# Patient Record
Sex: MALE | Race: WHITE | NOT HISPANIC OR LATINO | Employment: OTHER | URBAN - NONMETROPOLITAN AREA
[De-identification: names, ages, dates, MRNs, and addresses within clinical notes are randomized per-mention and may not be internally consistent; named-entity substitution may affect disease eponyms.]

---

## 2017-07-24 ENCOUNTER — APPOINTMENT (OUTPATIENT)
Dept: OCCUPATIONAL MEDICINE | Facility: OTHER | Age: 62
End: 2017-07-24

## 2017-07-24 PROCEDURE — 99000 SPECIMEN HANDLING OFFICE-LAB: CPT

## 2017-08-01 ENCOUNTER — TRANSFERRED RECORDS (OUTPATIENT)
Dept: HEALTH INFORMATION MANAGEMENT | Facility: HOSPITAL | Age: 62
End: 2017-08-01

## 2017-08-02 ENCOUNTER — OFFICE VISIT (OUTPATIENT)
Dept: FAMILY MEDICINE | Facility: OTHER | Age: 62
End: 2017-08-02
Attending: FAMILY MEDICINE
Payer: COMMERCIAL

## 2017-08-02 VITALS
WEIGHT: 179 LBS | SYSTOLIC BLOOD PRESSURE: 114 MMHG | OXYGEN SATURATION: 99 % | HEART RATE: 67 BPM | BODY MASS INDEX: 27.13 KG/M2 | RESPIRATION RATE: 20 BRPM | HEIGHT: 68 IN | DIASTOLIC BLOOD PRESSURE: 70 MMHG

## 2017-08-02 DIAGNOSIS — Z01.818 PREOP GENERAL PHYSICAL EXAM: Primary | ICD-10-CM

## 2017-08-02 PROCEDURE — 99214 OFFICE O/P EST MOD 30 MIN: CPT | Mod: 25 | Performed by: FAMILY MEDICINE

## 2017-08-02 PROCEDURE — 93000 ELECTROCARDIOGRAM COMPLETE: CPT | Performed by: INTERNAL MEDICINE

## 2017-08-02 ASSESSMENT — PAIN SCALES - GENERAL: PAINLEVEL: NO PAIN (0)

## 2017-08-02 NOTE — MR AVS SNAPSHOT
After Visit Summary   8/2/2017    Javier Zimmer    MRN: [id number]           Patient Information     Date Of Birth          1955        Visit Information        Provider Department      8/2/2017 8:20 AM Javier Lomeli MD [facility]        Today's Diagnoses     Preop general physical exam    -  1      Care Instructions      Before Your Surgery      Call your surgeon if there is any change in your health. This includes signs of a cold or flu (such as a sore throat, runny nose, cough, rash or fever).    Do not smoke, drink alcohol or take over the counter medicine (unless your surgeon or primary care doctor tells you to) for the 24 hours before and after surgery.    If you take prescribed drugs: Follow your doctor s orders about which medicines to take and which to stop until after surgery.    Eating and drinking prior to surgery: follow the instructions from your surgeon    Take a shower or bath the night before surgery. Use the soap your surgeon gave you to gently clean your skin. If you do not have soap from your surgeon, use your regular soap. Do not shave or scrub the surgery site.  Wear clean pajamas and have clean sheets on your bed.           Follow-ups after your visit        Follow-up notes from your care team     Return as needed.      Who to contact     If you have questions or need follow up information about today's clinic visit or your schedule please contact [facility] directly at [phone].  Normal or non-critical lab and imaging results will be communicated to you by MyChart, letter or phone within 4 business days after the clinic has received the results. If you do not hear from us within 7 days, please contact the clinic through MyChart or phone. If you have a critical or abnormal lab result, we will notify you by phone as soon as possible.  Submit refill requests through [employer] or call your pharmacy and they will forward the refill request  "to us. Please allow 3 business days for your refill to be completed.          Additional Information About Your Visit        MyChart Information     [employer] lets you send messages to your doctor, view your test results, renew your prescriptions, schedule appointments and more. To sign up, go to [email or web address] . Click on \"Log in\" on the left side of the screen, which will take you to the Welcome page. Then click on \"Sign up Now\" on the right side of the page.     You will be asked to enter the access code listed below, as well as some personal information. Please follow the directions to create your username and password.     Your access code is: [email or web address]  Expires: 10/31/2017  9:58 AM     Your access code will  in 90 days. If you need help or a new code, please call your [place] clinic or [phone].        Care EveryWhere ID     This is your [facility] EveryWhere ID. This could be used by other organizations to access your [place] medical records  [id number]        Your Vitals Were     Pulse Respirations Height Pulse Oximetry BMI (Body Mass Index)       67 20 5' 8\" (1.727 m) 99% 27.22 kg/m2        Blood Pressure from Last 3 Encounters:   17 114/70   16 124/76   13 94/65    Weight from Last 3 Encounters:   17 179 lb (81.2 kg)   16 218 lb (98.9 kg)              We Performed the Following     EKG 12-lead complete w/read - (Clinic Performed)        Primary Care Provider Office Phone # Fax #    Mann Nolen [phone]       [facility] [address] [address]        Equal Access to Services     [facility] : Dean Rivera, renea grimes, nain stauffer, jorge alberto oswald. So [facility] [phone].    ATENCIÓN: Si habla español, tiene a mcginnis disposición servicios gratuitos de asistencia lingüística. Llame al [phone].    We comply with applicable federal civil rights laws and " [place] laws. We do not discriminate on the basis of race, color, national origin, age, disability sex, sexual orientation or gender identity.            Thank you!     Thank you for choosing [facility]  for your care. Our goal is always to provide you with excellent care. Hearing back from our patients is one way we can continue to improve our services. Please take a few minutes to complete the written survey that you may receive in the mail after your visit with us. Thank you!             Your Updated Medication List - Protect others around you: Learn how to safely use, store and throw away your medicines at www.disposemymeds.org.          This list is accurate as of: 8/2/17  9:58 AM.  Always use your most recent med list.                   Brand Name Dispense Instructions for use Diagnosis    ADVAIR DISKUS 100-50 MCG/DOSE diskus inhaler   Generic drug:  fluticasone-salmeterol      Inhale 1 puff into the lungs 2 times daily        sildenafil 20 MG tablet    REVATIO/VIAGRA    30 tablet    Take 2 and 1/2 tablet as needed. No more than 5 pills in one week.    Erectile dysfunction due to diseases classified elsewhere       VENTOLIN  (90 BASE) MCG/ACT Inhaler   Generic drug:  albuterol      Inhale 2 puffs into the lungs every 4 hours as needed

## 2017-08-02 NOTE — PROGRESS NOTES
[facility] [place]  [address]  [place] [address]  [phone]  Dept: [phone]    PRE-OP EVALUATION:  Today's date: 2017    Javier Zimmer (: 1955) presents for pre-operative evaluation assessment as requested by Dr. Raman.  He requires evaluation and anesthesia risk assessment prior to undergoing surgery/procedure for treatment of retina attachment .  Proposed procedure: Vitrectomy     Date of Surgery/ Procedure: 2017  Time of Surgery/ Procedure: unknown  Hospital/Surgical Facility: [facility]   Fax number for surgical facility: [phone]  Primary Physician: Mann Nolen  Type of Anesthesia Anticipated: to be determined    Patient has a Health Care Directive or Living Will:  NO    1. NO - Do you have a history of heart attack, stroke, stent, bypass or surgery on an artery in the head, neck, heart or legs?  2. NO - Do you ever have any pain or discomfort in your chest?  3. NO - Do you have a history of  Heart Failure?  4. NO - Are you troubled by shortness of breath when: walking on the level, up a slight hill or at night?  5. NO - Do you currently have a cold, bronchitis or other respiratory infection?  6. NO - Do you have a cough, shortness of breath or wheezing?  7. NO - Do you sometimes get pains in the calves of your legs when you walk?  8. NO - Do you or anyone in your family have previous history of blood clots?  9. NO - Do you or does anyone in your family have a serious bleeding problem such as prolonged bleeding following surgeries or cuts?  10. NO - Have you ever had problems with anemia or been told to take iron pills?  11. NO - Have you had any abnormal blood loss such as black, tarry or bloody stools, or abnormal vaginal bleeding?  12. NO - Have you ever had a blood transfusion?  13. NO - Have you or any of your relatives ever had problems with anesthesia?  14. NO - Do you have sleep apnea, excessive snoring or daytime drowsiness?  15. NO - Do  you have any prosthetic heart valves?  16. NO - Do you have prosthetic joints?  17. NO - Is there any chance that you may be pregnant?        HPI:                                                      Brief HPI related to upcoming procedure: Jeffrey has a history of right retinal detachment and has had vision loss.  He was seen by Retinal Surgery where it was felt the patient would benefit from surgical management.  I was asked to see him for preoperative medical clearance.        See problem list for active medical problems.  Problems all longstanding and stable, except as noted/documented.  See ROS for pertinent symptoms related to these conditions.                                                                                                  .    MEDICAL HISTORY:                                                    Patient Active Problem List    Diagnosis Date Noted     Low testosterone 12/12/2016     Priority: Medium     ACP (advance care planning) 12/12/2016     Priority: Medium     Advance Care Planning 12/12/2016: ACP Review of Chart / Resources Provided:  Reviewed chart for advance care plan.  Javier Zimmer has no plan or code status on file. Discussed available resources and provided with information. Confirmed code status reflects current choices pending further ACP discussions.  Confirmed/documented legally designated decision makers.  Added by Marta Espana              Past Medical History:   Diagnosis Date     Low testosterone 12/12/2016     Past Surgical History:   Procedure Laterality Date     BACK SURGERY      2014     HERNIA REPAIR      Over 30 years ago     Current Outpatient Prescriptions   Medication Sig Dispense Refill     fluticasone-salmeterol (ADVAIR DISKUS) 100-50 MCG/DOSE diskus inhaler Inhale 1 puff into the lungs 2 times daily        albuterol (VENTOLIN HFA) 108 (90 BASE) MCG/ACT Inhaler Inhale 2 puffs into the lungs every 4 hours as needed        sildenafil (REVATIO/VIAGRA) 20 MG  "tablet Take 2 and 1/2 tablet as needed. No more than 5 pills in one week. (Patient not taking: Reported on 8/2/2017) 30 tablet 0     OTC products: None, except as noted above    Allergies   Allergen Reactions     Bee Venom Anaphylaxis      Latex Allergy: NO    Social History   Substance Use Topics     Smoking status: Current Every Day Smoker     Packs/day: 1.00     Years: 5.00     Types: Cigarettes     Smokeless tobacco: Not on file     Alcohol use Yes      Comment: daily use     History   Drug Use Not on file       REVIEW OF SYSTEMS:                                                    Constitutional, neuro, ENT, endocrine, pulmonary, cardiac, gastrointestinal, genitourinary, musculoskeletal, integument and psychiatric systems are negative, except as otherwise noted.      EXAM:                                                    /70  Pulse 67  Resp 20  Ht 5' 8\" (1.727 m)  Wt 179 lb (81.2 kg)  SpO2 99%  BMI 27.22 kg/m2    GENERAL APPEARANCE: healthy, alert and no distress     EYES: EOMI,  PERRL     HENT: ear canals and TM's normal and nose and mouth without ulcers or lesions     NECK: no adenopathy, no asymmetry, masses, or scars and thyroid normal to palpation     RESP: lungs clear to auscultation - no rales, rhonchi or wheezes     CV: regular rates and rhythm, normal S1 S2, no S3 or S4 and no murmur, click or rub     ABDOMEN:  soft, nontender, no HSM or masses and bowel sounds normal     MS: extremities normal- no gross deformities noted, no evidence of inflammation in joints, FROM in all extremities.     SKIN: no suspicious lesions or rashes     NEURO: Normal strength and tone, sensory exam grossly normal, mentation intact and speech normal     PSYCH: mentation appears normal. and affect normal/bright     LYMPHATICS: No axillary, cervical, or supraclavicular nodes    DIAGNOSTICS:                                                    EKG: appears normal, NSR, normal axis, normal intervals, no acute ST/T " changes c/w ischemia, no LVH by voltage criteria, unchanged from previous tracings    Recent Labs   Lab Test 01/30/14   POTASSIUM  4.2   CR  1.1        IMPRESSION:                                                    Reason for surgery/procedure: Retinal detachment, right eye    The proposed surgical procedure is considered INTERMEDIATE risk.    REVISED CARDIAC RISK INDEX  The patient has the following serious cardiovascular risks for perioperative complications such as (MI, PE, VFib and 3  AV Block):  No serious cardiac risks  INTERPRETATION: 0 risks: Class I (very low risk - 0.4% complication rate)    The patient has the following additional risks for perioperative complications:  No identified additional risks    No diagnosis found.    RECOMMENDATIONS:                                                      APPROVAL GIVEN to proceed with proposed procedure, without further diagnostic evaluation       Signed Electronically by: Javier Lomeli MD    Copy of this evaluation report is provided to requesting physician.    [facility] Preop Guidelines

## 2017-08-02 NOTE — NURSING NOTE
"Chief Complaint   Patient presents with     Pre-Op Exam       Initial /70  Pulse 67  Resp 20  Ht 5' 8\" (1.727 m)  Wt 179 lb (81.2 kg)  SpO2 99%  BMI 27.22 kg/m2 Estimated body mass index is 27.22 kg/(m^2) as calculated from the following:    Height as of this encounter: 5' 8\" (1.727 m).    Weight as of this encounter: 179 lb (81.2 kg).  Medication Reconciliation: complete   Enedina Melissa      "

## 2017-09-05 ENCOUNTER — TRANSFERRED RECORDS (OUTPATIENT)
Dept: HEALTH INFORMATION MANAGEMENT | Facility: HOSPITAL | Age: 62
End: 2017-09-05

## 2018-03-30 ENCOUNTER — HOSPITAL ENCOUNTER (EMERGENCY)
Facility: HOSPITAL | Age: 63
Discharge: HOME | End: 2018-03-30
Attending: NURSE PRACTITIONER | Admitting: NURSE PRACTITIONER
Payer: COMMERCIAL

## 2018-03-30 VITALS — RESPIRATION RATE: 16 BRPM | OXYGEN SATURATION: 98 % | TEMPERATURE: 97.6 F

## 2018-03-30 DIAGNOSIS — L72.3 SEBACEOUS CYST: ICD-10-CM

## 2018-03-30 PROCEDURE — 99214 OFFICE O/P EST MOD 30 MIN: CPT | Performed by: NURSE PRACTITIONER

## 2018-03-30 PROCEDURE — G0463 HOSPITAL OUTPT CLINIC VISIT: HCPCS

## 2018-03-30 RX ORDER — SULFAMETHOXAZOLE/TRIMETHOPRIM 800-160 MG
1 TABLET ORAL 2 TIMES DAILY
Qty: 20 TABLET | Refills: 0 | Status: SHIPPED | OUTPATIENT
Start: 2018-03-30 | End: 2018-04-09

## 2018-03-30 NOTE — ED AVS SNAPSHOT
[facility]    [address]    [place] [address]    Phone:  [phone]                                       Javier Zimmer   MRN: [id number]    Department:  [facility]   Date of Visit:  3/30/2018           Patient Information     Date Of Birth          1955        Your diagnoses for this visit were:     Sebaceous cyst        You were seen by Jodie Cartwright NP.      Follow-up Information     Follow up with Mily Strickland PA In 5 days.    Specialty:  Family Practice    Why:  for re-evaluation    Contact information:    [facility]  [address]  [place] [address] [phone]          Follow up with [facility].    Specialty:  EMERGENCY MEDICINE    Why:  If symptoms worsen    Contact information:    [address]  [place] [address] [phone]    Additional information:    From [facility]: Take [address]. Turn left at [address]. Turn left at the first stoplight on [address]. At the first stop sign, take a right onto [address]. Take a left into the parking lot and continue through until you reach the North enterance of the building.       From [place]: Take [address]. Take the [address] ramp towards [place]. Turn left onto [address]. Take a slight right onto [address]. Turn left at the first stoplight on [address]. At the first stop sign, take a right onto [address]. Take a left into the parking lot and continue through until you reach the North enterance of the building.       From [place]: Take [address]. Take a right at [address]. At the first stop sign, take a right onto [address]. Take a left into the parking lot and continue through until you reach the North enterance of the building.         Discharge Instructions         Start antibiotic if symptoms do not continue to improve over the next 24-48  hours.   Apply warm wet wash clothes to the area 4-5 times a day for 10 minutes until symptoms resolve.   Return here if symptoms worsen.   See PCP in 3-5 days to have wound rechecked.    Epidermoid Cyst (Sebaceous Cyst), Infected (Antibiotic Treatment)  You have an epidermoid cyst. This is a small, painless lump under your skin. An epidermoid cyst (often called a sebaceous cyst, epidermal cyst, or epidermal inclusion cyst) is a term most often used for 2 similar types of cysts:    Epidermoid cysts. These cysts form slowly under the skin. They can be found on most parts of the body. But they are most often found on areas with more hair such as the scalp, face, upper back, and genitals.    Pilar cysts. These are similar to epidermoid cysts. But they start from a different part of the hair follicle. They are more likely to be on the scalp.  Here are some general facts about these cysts:    A cyst is a sac filled with material that is often cheesy, fatty, oily, or stringy. The material inside can be thick. Or it can be a liquid.    You can usually move the cyst slightly if you try.    The cysts can be smaller than a pea or as large as a few inches.    The cysts are usually not painful, unless they become inflamed or infected.    The area around the cyst may smell bad. If the cyst breaks open, the material inside it often smells bad as well.  Your cyst became infected but it has drained and should continue to improve as long as you keep in clean and apply the warm packs.   Home care    Resist the temptation to squeeze or pop the cyst, stick a needle in it, or cut it open. This often leads to a worsening infection and scarring.    Soak the affected area in hot water or apply a hot pack (a thin, clean towel soaked in hot water) for 20 minutes at a time. Do this 3 to 4 times a day.    Apply antibiotic cream or ointment 3 times a day.    You may use over-the-counter pain medicine to control pain.  Prevention  Once this  infection has healed, reduce the risk of future infections by:    Keeping the cyst area clean by bathing or showering daily    Avoiding tight-fitting clothing in the cyst area  Follow-up care  Follow up with your healthcare provider, or as advised.         Review of your medicines      START taking        Dose / Directions Last dose taken    sulfamethoxazole-trimethoprim 800-160 MG per tablet   Commonly known as:  BACTRIM DS   Dose:  1 tablet   Quantity:  20 tablet        Take 1 tablet by mouth 2 times daily for 10 days   Refills:  0          Our records show that you are taking the medicines listed below. If these are incorrect, please call your family doctor or clinic.        Dose / Directions Last dose taken    ADVAIR DISKUS 100-50 MCG/DOSE diskus inhaler   Dose:  1 puff   Generic drug:  fluticasone-salmeterol        Inhale 1 puff into the lungs 2 times daily   Refills:  0        sildenafil 20 MG tablet   Commonly known as:  REVATIO   Quantity:  30 tablet        Take 2 and 1/2 tablet as needed. No more than 5 pills in one week.   Refills:  0        VENTOLIN  (90 BASE) MCG/ACT Inhaler   Dose:  2 puff   Generic drug:  albuterol        Inhale 2 puffs into the lungs every 4 hours as needed   Refills:  0                Prescriptions were sent or printed at these locations (1 Prescription)                   [facility] [address] [place] [address] [address] AT [facility] [address]   [address] [place] [address]    Telephone:  [phone]   Fax:  [phone]   Hours:                  E-Prescribed (1 of 1)         sulfamethoxazole-trimethoprim (BACTRIM DS) 800-160 MG per tablet                Orders Needing Specimen Collection     None      Pending Results     No orders found from 3/28/2018 to 3/31/2018.            Pending Culture Results     No orders found from 3/28/2018 to 3/31/2018.            Thank you for choosing [facility]       Thank you for choosing [facility] for your care. Our goal is  "always to provide you with excellent care. Hearing back from our patients is one way we can continue to improve our services. Please take a few minutes to complete the written survey that you may receive in the mail after you visit with us. Thank you!        ImageV[employer] Information     [employer] lets you send messages to your doctor, view your test results, renew your prescriptions, schedule appointments and more. To sign up, go to [email or web address] . Click on \"Log in\" on the left side of the screen, which will take you to the Welcome page. Then click on \"Sign up Now\" on the right side of the page.     You will be asked to enter the access code listed below, as well as some personal information. Please follow the directions to create your username and password.     Your access code is: [email or web address]  Expires: 2018  1:33 PM     Your access code will  in 90 days. If you need help or a new code, please call your [facility] clinic or [phone].        Care EveryWhere ID     This is your Care EveryWhere ID. This could be used by other organizations to access your [facility] medical records  [id number]        Equal Access to Services     AUBREY VILLALTA : Hadii yoly Rivera, renea grimes, nain reneealpurvi stauffer, jorge alberto oswald. So [facility] [phone].    ATENCIÓN: Si habla español, tiene a mcginnis disposición servicios gratuitos de asistencia lingüística. Andre al [phone].    We comply with applicable federal civil rights laws and [place] laws. We do not discriminate on the basis of race, color, national origin, age, disability, sex, sexual orientation, or gender identity.            After Visit Summary       This is your record. Keep this with you and show to your community pharmacist(s) and doctor(s) at your next visit.                  "

## 2018-03-30 NOTE — ED AVS SNAPSHOT
[facility]    [address] [address]    Phone:  [phone]                                       Javier Zimmer   MRN: [id number]    Department:  [facility]   Date of Visit:  3/30/2018           After Visit Summary Signature Page     I have received my discharge instructions, and my questions have been answered. I have discussed any challenges I see with this plan with the nurse or doctor.    ..........................................................................................................................................  Patient/Patient Representative Signature      ..........................................................................................................................................  Patient Representative Print Name and Relationship to Patient    ..................................................               ................................................  Date                                            Time    ..........................................................................................................................................  Reviewed by Signature/Title    ...................................................              ..............................................  Date                                                            Time

## 2018-03-30 NOTE — ED NOTES
Patient presents with cyst on inner Lt thigh.  Patient popped it X 2 days ago.  Increase in size and smell and drainage X 2 weeks.

## 2018-03-30 NOTE — DISCHARGE INSTRUCTIONS
Start antibiotic if symptoms do not continue to improve over the next 24-48 hours.   Apply warm wet wash clothes to the area 4-5 times a day for 10 minutes until symptoms resolve.   Return here if symptoms worsen.   See PCP in 3-5 days to have wound rechecked.    Epidermoid Cyst (Sebaceous Cyst), Infected (Antibiotic Treatment)  You have an epidermoid cyst. This is a small, painless lump under your skin. An epidermoid cyst (often called a sebaceous cyst, epidermal cyst, or epidermal inclusion cyst) is a term most often used for 2 similar types of cysts:    Epidermoid cysts. These cysts form slowly under the skin. They can be found on most parts of the body. But they are most often found on areas with more hair such as the scalp, face, upper back, and genitals.    Pilar cysts. These are similar to epidermoid cysts. But they start from a different part of the hair follicle. They are more likely to be on the scalp.  Here are some general facts about these cysts:    A cyst is a sac filled with material that is often cheesy, fatty, oily, or stringy. The material inside can be thick. Or it can be a liquid.    You can usually move the cyst slightly if you try.    The cysts can be smaller than a pea or as large as a few inches.    The cysts are usually not painful, unless they become inflamed or infected.    The area around the cyst may smell bad. If the cyst breaks open, the material inside it often smells bad as well.  Your cyst became infected but it has drained and should continue to improve as long as you keep in clean and apply the warm packs.   Home care    Resist the temptation to squeeze or pop the cyst, stick a needle in it, or cut it open. This often leads to a worsening infection and scarring.    Soak the affected area in hot water or apply a hot pack (a thin, clean towel soaked in hot water) for 20 minutes at a time. Do this 3 to 4 times a day.    Apply antibiotic cream or ointment 3 times a day.    You may  use over-the-counter pain medicine to control pain.  Prevention  Once this infection has healed, reduce the risk of future infections by:    Keeping the cyst area clean by bathing or showering daily    Avoiding tight-fitting clothing in the cyst area  Follow-up care  Follow up with your healthcare provider, or as advised.

## 2018-03-30 NOTE — ED PROVIDER NOTES
History     Chief Complaint   Patient presents with     Cyst     left inner thigh, reports it may be infected     The history is provided by the patient. No  was used.     Javier Zimmer is a 62 year old male who presents with a cyst on his left inner thigh.  Has had a lump there for many years, in the past week it has enlarged in size reports it was raised up over half an inch when he opened it up and it drained out a large odorous amount of purulent fluid 2 days ago.  Denies fever, body aches, or chills.    Problem List:    Patient Active Problem List    Diagnosis Date Noted     Low testosterone 12/12/2016     Priority: Medium     ACP (advance care planning) 12/12/2016     Priority: Medium     Advance Care Planning 12/12/2016: ACP Review of Chart / Resources Provided:  Reviewed chart for advance care plan.  Javier Zimmer has no plan or code status on file. Discussed available resources and provided with information. Confirmed code status reflects current choices pending further ACP discussions.  Confirmed/documented legally designated decision makers.  Added by Marta Espana             Benign prostatic hyperplasia with urinary obstruction 03/25/2009     Priority: Medium     Overview:   IMO Update 10/11  Updated per 10/1/17 IMO import          Past Medical History:    Past Medical History:   Diagnosis Date     Low testosterone 12/12/2016       Past Surgical History:    Past Surgical History:   Procedure Laterality Date     BACK SURGERY      2014     HERNIA REPAIR      Over 30 years ago       Family History:    Family History   Problem Relation Age of Onset     Breast Cancer Mother      Other Cancer Sister        Social History:  Marital Status:   [2]  Social History   Substance Use Topics     Smoking status: Current Every Day Smoker     Packs/day: 1.00     Years: 5.00     Types: Cigarettes     Smokeless tobacco: Not on file     Alcohol use Yes      Comment: daily use         Medications:      sulfamethoxazole-trimethoprim (BACTRIM DS) 800-160 MG per tablet   fluticasone-salmeterol (ADVAIR DISKUS) 100-50 MCG/DOSE diskus inhaler   albuterol (VENTOLIN HFA) 108 (90 BASE) MCG/ACT Inhaler   sildenafil (REVATIO/VIAGRA) 20 MG tablet         Review of Systems   Constitutional: Negative for chills, diaphoresis and fever.   Musculoskeletal: Negative.    Skin: Positive for wound (cyst on inner left thigh).       Physical Exam   Heart Rate: 76  Temp: 97.6  F (36.4  C)  Resp: 16  SpO2: 98 %      Physical Exam   Constitutional: He is oriented to person, place, and time. He appears well-developed and well-nourished. No distress.   HENT:   Head: Normocephalic and atraumatic.   Nose: Nose normal.   Neck: Normal range of motion.   Cardiovascular: Normal rate.    Pulmonary/Chest: Effort normal.   Musculoskeletal: Normal range of motion.        Legs:  Normal ROM in left hip and knee.    Neurological: He is alert and oriented to person, place, and time.   Skin: He is not diaphoretic.   Psychiatric: He has a normal mood and affect. His behavior is normal.   Nursing note and vitals reviewed.      ED Course     ED Course     Procedures     No results found for this or any previous visit (from the past 24 hour(s)).    Medications - No data to display    Assessments & Plan (with Medical Decision Making)     I have reviewed the nursing notes.  I have reviewed the findings, diagnosis, plan and need for follow up with the patient.  Infected sebaceous cyst opened and drained 2 days ago resolving at this time, very localized reaction.    No indication of cellulitis identified.   Advised to continue hot packs several times a day,  apply OTC antibiotic ointment and keep covered until healed.  If symptoms are not continuing to improve in 24-48 hours start the antibiotic.    Return here if symptoms worsen.  Follow-up with primary care in 2-3 days for reevaluation.  Given epic educational materials and written  instructions regarding wound care.    Discharge Medication List as of 3/30/2018  1:40 PM      START taking these medications    Details   sulfamethoxazole-trimethoprim (BACTRIM DS) 800-160 MG per tablet Take 1 tablet by mouth 2 times daily for 10 days, Disp-20 tablet, R-0, E-Prescribe             Final diagnoses:   Sebaceous cyst       3/30/2018   [facility]     Jodie Cartwright NP  03/31/18 [id number]

## 2018-03-31 ASSESSMENT — ENCOUNTER SYMPTOMS
MUSCULOSKELETAL NEGATIVE: 1
FEVER: 0
WOUND: 1
CHILLS: 0
DIAPHORESIS: 0

## 2018-08-29 NOTE — PROGRESS NOTES
SUBJECTIVE:   Javier Zimmer is a 62 year old male who presents to clinic today for the following health issues:      WART(S)      Onset: a few months    Description (location/number): right foot out side of foot    Accompanying signs and symptoms: Painful: YES    History: prior warts: no     Therapies tried and outcome: None      Low tesosterone and ED - prior testosterone injections.  At some point stopped.  Did feel better on them - more energy, strength, sexual performance.  States he was rechecked at some point and told he didn't need it anymore.  Does use viagra as needed as well.  No cardiac history.    Interested in shingles vaccine.    Due for hep C and colon cancer screening.    Tobacco use - 1 1/2 ppd.  Has quit before on his own.  Declines assistance.  Declines CT at this time, but will consider it this fall with his physical.    Problem list and histories reviewed & adjusted, as indicated.  Additional history: as documented    Current Outpatient Prescriptions   Medication     sildenafil (VIAGRA) 100 MG tablet     zoster vaccine live, PF, (ZOSTAVAX) injection     No current facility-administered medications for this visit.        Patient Active Problem List   Diagnosis     Low testosterone     ACP (advance care planning)     Benign prostatic hyperplasia with urinary obstruction     Tobacco use disorder     Past Surgical History:   Procedure Laterality Date     BACK SURGERY      2014     HERNIA REPAIR      Over 30 years ago       Social History   Substance Use Topics     Smoking status: Current Every Day Smoker     Packs/day: 1.00     Years: 5.00     Types: Cigarettes     Smokeless tobacco: Never Used     Alcohol use Yes      Comment: daily use- beer      Family History   Problem Relation Age of Onset     Breast Cancer Mother      Other Cancer Sister            Reviewed and updated as needed this visit by clinical staff       Reviewed and updated as needed this visit by Provider          ROS:  CONSTITUTIONAL:NEGATIVE for fever, chills, change in weight  INTEGUMENTARY/SKIN: POSITIVE for as above  RESP:NEGATIVE for significant cough or SOB  CV: NEGATIVE for chest pain, palpitations or peripheral edema  GI: NEGATIVE for nausea, abdominal pain, heartburn, or change in bowel habits  : negative for dysuria, hematuria, POSITIVE for erectile dysfunction    OBJECTIVE:     /70 (BP Location: Right arm, Patient Position: Sitting, Cuff Size: Adult Large)  Pulse 77  Temp 98  F (36.7  C) (Tympanic)  Wt 198 lb (89.8 kg)  SpO2 98%  BMI 30.11 kg/m2  Body mass index is 30.11 kg/(m^2).  GENERAL: healthy, alert and no distress  RESP: lungs clear to auscultation - no rales, rhonchi or wheezes  CV: regular rate and rhythm, normal S1 S2, no S3 or S4, no murmur, click or rub, no peripheral edema and peripheral pulses strong  ABDOMEN: soft, nontender, no hepatosplenomegaly, no masses and bowel sounds normal  MS: no gross musculoskeletal defects noted, no edema  SKIN: right lateral foot with hard corn, <0.5 cm with surrounding callous  PSYCH: mentation appears normal, affect normal/bright      ASSESSMENT/PLAN:     (Z11.59) Need for hepatitis C screening test  (primary encounter diagnosis)  Comment: ordered  Plan: Hepatitis C Screen Reflex to HCV RNA Quant and         Genotype    (Z12.11) Special screening for malignant neoplasms, colon  Comment: no prior colonoscopy; agreeable to referral  Plan: GENERAL SURG ADULT REFERRAL    (F17.200) Tobacco use disorder  (Z71.6) Encounter for tobacco use cessation counseling  Comment:did offer CT screen    (E34.9) Low testosterone  Comment: repeat level today  Plan: Testosterone, total            (L84) Corn or callus  Comment: pared down with blade in office; tolerated well; care instructions given - soaking foot, pumice stone, corn pads, comfortable shoes/orthotics    (N52.9) Erectile dysfunction, unspecified erectile dysfunction type  Plan: sildenafil (VIAGRA) 100 MG  tablet    (Z23) Need for shingles vaccine  Comment: script given  Plan: zoster vaccine live, PF, (ZOSTAVAX) injection          Will call with lab results.  Referral for colonoscopy.  Consider low dose CT scan for lung cancer screening.  Smoking cessation advised.  Script for Viagra.  Script for Zostavax - shingles vaccine.      See patient instructions.        Luly Nagy MD  [facility]

## 2018-08-31 ENCOUNTER — OFFICE VISIT (OUTPATIENT)
Dept: FAMILY MEDICINE | Facility: OTHER | Age: 63
End: 2018-08-31
Attending: FAMILY MEDICINE
Payer: COMMERCIAL

## 2018-08-31 VITALS
SYSTOLIC BLOOD PRESSURE: 102 MMHG | OXYGEN SATURATION: 98 % | BODY MASS INDEX: 30.11 KG/M2 | WEIGHT: 198 LBS | HEART RATE: 77 BPM | DIASTOLIC BLOOD PRESSURE: 70 MMHG | TEMPERATURE: 98 F

## 2018-08-31 DIAGNOSIS — Z12.11 SPECIAL SCREENING FOR MALIGNANT NEOPLASMS, COLON: ICD-10-CM

## 2018-08-31 DIAGNOSIS — Z11.59 NEED FOR HEPATITIS C SCREENING TEST: Primary | ICD-10-CM

## 2018-08-31 DIAGNOSIS — Z71.6 ENCOUNTER FOR TOBACCO USE CESSATION COUNSELING: ICD-10-CM

## 2018-08-31 DIAGNOSIS — R79.89 LOW TESTOSTERONE: ICD-10-CM

## 2018-08-31 DIAGNOSIS — F17.200 TOBACCO USE DISORDER: ICD-10-CM

## 2018-08-31 DIAGNOSIS — L84 CORN OR CALLUS: ICD-10-CM

## 2018-08-31 DIAGNOSIS — N52.9 ERECTILE DYSFUNCTION, UNSPECIFIED ERECTILE DYSFUNCTION TYPE: ICD-10-CM

## 2018-08-31 DIAGNOSIS — Z23 NEED FOR SHINGLES VACCINE: ICD-10-CM

## 2018-08-31 PROCEDURE — 86803 HEPATITIS C AB TEST: CPT | Mod: 90 | Performed by: FAMILY MEDICINE

## 2018-08-31 PROCEDURE — 84403 ASSAY OF TOTAL TESTOSTERONE: CPT | Mod: 90 | Performed by: FAMILY MEDICINE

## 2018-08-31 PROCEDURE — 36415 COLL VENOUS BLD VENIPUNCTURE: CPT | Performed by: FAMILY MEDICINE

## 2018-08-31 PROCEDURE — 99214 OFFICE O/P EST MOD 30 MIN: CPT | Performed by: FAMILY MEDICINE

## 2018-08-31 PROCEDURE — 99000 SPECIMEN HANDLING OFFICE-LAB: CPT | Performed by: FAMILY MEDICINE

## 2018-08-31 RX ORDER — SILDENAFIL 100 MG/1
100 TABLET, FILM COATED ORAL DAILY PRN
Qty: 12 TABLET | Refills: 11 | Status: SHIPPED | OUTPATIENT
Start: 2018-08-31

## 2018-08-31 ASSESSMENT — ANXIETY QUESTIONNAIRES
7. FEELING AFRAID AS IF SOMETHING AWFUL MIGHT HAPPEN: NOT AT ALL
6. BECOMING EASILY ANNOYED OR IRRITABLE: NOT AT ALL
5. BEING SO RESTLESS THAT IT IS HARD TO SIT STILL: NOT AT ALL
GAD7 TOTAL SCORE: 0
4. TROUBLE RELAXING: NOT AT ALL
1. FEELING NERVOUS, ANXIOUS, OR ON EDGE: NOT AT ALL
2. NOT BEING ABLE TO STOP OR CONTROL WORRYING: NOT AT ALL
3. WORRYING TOO MUCH ABOUT DIFFERENT THINGS: NOT AT ALL

## 2018-08-31 ASSESSMENT — PAIN SCALES - GENERAL: PAINLEVEL: MILD PAIN (3)

## 2018-08-31 NOTE — PATIENT INSTRUCTIONS
Will call with lab results.  Referral for colonoscopy.  Consider low dose CT scan for lung cancer screening.  Smoking cessation advised.  Script for Viagra.  Script for Zostavax - shingles vaccine.        Treating Corns and Calluses  If your corns or calluses are mild, reducing friction may help. Different shoes, moleskin patches, or soft pads may be all the treatment you need. In more severe cases, treating tissue buildup may require your doctor s care. Sometimes custom-made shoe inserts (orthotics) or special pads are prescribed to reduce friction and pressure.    Change shoes  If you have corns, your doctor may suggest wearing shoes that have more toe room. This way, buckled joints are less likely to be pinched against the top of the shoe. If you have calluses, wearing a cushioned insole, arch support, or heel counter can help reduce friction.  Visit your doctor  In some cases, your doctor may trim away the outer layers of skin that make up the corn or callus. For a painful corn, medicine may be injected beneath the built-up tissue.  Wear orthotics  Orthotics are specially made to meet the needs of your feet. They cushion calluses or divert pressure away from these problem areas. Worn as directed, orthotics help limit existing problems and prevent new ones from forming.  If you need surgery  If a bone or joint is out of place, certain parts of your foot may be under too much pressure. This can cause severe corns and calluses. In such cases, surgery may be the best way to correct the problem.  Outpatient procedures  In most cases, surgery to improve bone position is an outpatient procedure. Your doctor may cut away excess bone, reposition prominent bones, or even fuse joints. Sometimes tendons or ligaments are cut to reduce tension on a bone or joint. Your doctor will talk with you about the procedure that is best suited to your needs.  Date Last Reviewed: 7/1/2016 2000-2017 [employer] [address]  [place] [address]. All rights reserved. This information is not intended as a substitute for professional medical care. Always follow your healthcare professional's instructions.

## 2018-08-31 NOTE — MR AVS SNAPSHOT
After Visit Summary   8/31/2018    Javier Zimmer    MRN: [id number]           Patient Information     Date Of Birth          1955        Visit Information        Provider Department      8/31/2018 8:45 AM Luly Wu MD [facility]        Today's Diagnoses     Need for hepatitis C screening test    -  1    Special screening for malignant neoplasms, colon        Tobacco use disorder        Encounter for tobacco use cessation counseling        Low testosterone        Corn or callus        Erectile dysfunction, unspecified erectile dysfunction type        Erectile dysfunction due to diseases classified elsewhere        Need for shingles vaccine          Care Instructions    Will call with lab results.  Referral for colonoscopy.  Consider low dose CT scan for lung cancer screening.  Smoking cessation advised.  Script for Viagra.  Script for Zostavax - shingles vaccine.        Treating Corns and Calluses  If your corns or calluses are mild, reducing friction may help. Different shoes, moleskin patches, or soft pads may be all the treatment you need. In more severe cases, treating tissue buildup may require your doctor s care. Sometimes custom-made shoe inserts (orthotics) or special pads are prescribed to reduce friction and pressure.    Change shoes  If you have corns, your doctor may suggest wearing shoes that have more toe room. This way, buckled joints are less likely to be pinched against the top of the shoe. If you have calluses, wearing a cushioned insole, arch support, or heel counter can help reduce friction.  Visit your doctor  In some cases, your doctor may trim away the outer layers of skin that make up the corn or callus. For a painful corn, medicine may be injected beneath the built-up tissue.  Wear orthotics  Orthotics are specially made to meet the needs of your feet. They cushion calluses or divert pressure away from these problem areas. Worn as directed,  orthotics help limit existing problems and prevent new ones from forming.  If you need surgery  If a bone or joint is out of place, certain parts of your foot may be under too much pressure. This can cause severe corns and calluses. In such cases, surgery may be the best way to correct the problem.  Outpatient procedures  In most cases, surgery to improve bone position is an outpatient procedure. Your doctor may cut away excess bone, reposition prominent bones, or even fuse joints. Sometimes tendons or ligaments are cut to reduce tension on a bone or joint. Your doctor will talk with you about the procedure that is best suited to your needs.  Date Last Reviewed: 7/1/2016 2000-2017 [employer]. [address] [place] [address], [place] [address]. All rights reserved. This information is not intended as a substitute for professional medical care. Always follow your healthcare professional's instructions.                Follow-ups after your visit        Additional Services     GENERAL SURG ADULT REFERRAL       Your provider has referred you to: [facility]: [facility] [place] [phone]   [email or web address]    Please be aware that coverage of these services is subject to the terms and limitations of your health insurance plan.  Call member services at your health plan with any benefit or coverage questions.      Please bring the following with you to your appointment:    (1) Any X-Rays, CTs or MRIs which have been performed.  Contact the facility where they were done to arrange for  prior to your scheduled appointment.   (2) List of current medications   (3) This referral request   (4) Any documents/labs given to you for this referral                  Who to contact     If you have questions or need follow up information about today's clinic visit or your schedule please contact [facility] directly at [phone].  Normal or  non-critical lab and imaging results will be communicated to you by MyChart, letter or phone within 4 business days after the clinic has received the results. If you do not hear from us within 7 days, please contact the clinic through MyChart or phone. If you have a critical or abnormal lab result, we will notify you by phone as soon as possible.  Submit refill requests through pickrset or call your pharmacy and they will forward the refill request to us. Please allow 3 business days for your refill to be completed.          Additional Information About Your Visit        Care EveryWhere ID     This is your Care EveryWhere ID. This could be used by other organizations to access your [facility] medical records  [id number]        Your Vitals Were     Pulse Temperature Pulse Oximetry BMI (Body Mass Index)          77 98  F (36.7  C) (Tympanic) 98% 30.11 kg/m2         Blood Pressure from Last 3 Encounters:   08/31/18 102/70   08/02/17 114/70   12/12/16 124/76    Weight from Last 3 Encounters:   08/31/18 198 lb (89.8 kg)   08/02/17 179 lb (81.2 kg)   12/12/16 218 lb (98.9 kg)              We Performed the Following     GENERAL SURG ADULT REFERRAL     Hepatitis C Screen Reflex to HCV RNA Quant and Genotype     Testosterone, total          Today's Medication Changes          These changes are accurate as of 8/31/18  9:04 AM.  If you have any questions, ask your nurse or doctor.               Start taking these medicines.        Dose/Directions    sildenafil 100 MG tablet   Commonly known as:  VIAGRA   Used for:  Erectile dysfunction, unspecified erectile dysfunction type   Started by:  Luly Wu MD        Dose:  100 mg   Take 1 tablet (100 mg) by mouth daily as needed 30 min to 4 hrs before sex. Do not use with nitroglycerin, terazosin or doxazosin.   Quantity:  12 tablet   Refills:  11       zoster vaccine live (PF) injection   Commonly known as:  ZOSTAVAX   Used for:  Need for shingles vaccine   Started by:   Luly Wu MD        Dose:  1 each   Inject 0.65 mLs Subcutaneous once for 1 dose   Quantity:  0.65 mL   Refills:  0         Stop taking these medicines if you haven't already. Please contact your care team if you have questions.     ADVAIR DISKUS 100-50 MCG/DOSE diskus inhaler   Generic drug:  fluticasone-salmeterol   Stopped by:  Luly Wu MD           VENTOLIN  (90 Base) MCG/ACT inhaler   Generic drug:  albuterol   Stopped by:  Luly Wu MD                Where to get your medicines      These medications were sent to [id number] - [place], [place] - [address] [place] [facility] [place]  [address], [facility] [address]     Phone:  [phone]     sildenafil 100 MG tablet         Some of these will need a paper prescription and others can be bought over the counter.  Ask your nurse if you have questions.     Bring a paper prescription for each of these medications     zoster vaccine live (PF) injection                Primary Care Provider Office Phone # Fax #    SIOBHAN Leon [phone]       [address] [place] [id number]        Equal Access to Services     AUBREY VILLALTA AH: Hadii yoly huntley hadvirajo Soomaali, waaxda luqadaha, qaybta kaalmada adeegyada, jorge alberto oswald. So [facility] [phone].    ATENCIÓN: Si habla español, tiene a mcginnis disposición servicios gratuitos de asistencia lingüística. Andre al [phone].    We comply with applicable federal civil rights laws and [place] laws. We do not discriminate on the basis of race, color, national origin, age, disability, sex, sexual orientation, or gender identity.            Thank you!     Thank you for choosing [facility]  for your care. Our goal is always to provide you with excellent care. Hearing back from our patients is one way we can continue to improve our services. Please take a few minutes to complete the written survey that you  may receive in the mail after your visit with us. Thank you!             Your Updated Medication List - Protect others around you: Learn how to safely use, store and throw away your medicines at www.disposemymeds.org.          This list is accurate as of 8/31/18  9:04 AM.  Always use your most recent med list.                   Brand Name Dispense Instructions for use Diagnosis    sildenafil 100 MG tablet    VIAGRA    12 tablet    Take 1 tablet (100 mg) by mouth daily as needed 30 min to 4 hrs before sex. Do not use with nitroglycerin, terazosin or doxazosin.    Erectile dysfunction, unspecified erectile dysfunction type       zoster vaccine live (PF) injection    ZOSTAVAX    0.65 mL    Inject 0.65 mLs Subcutaneous once for 1 dose    Need for shingles vaccine

## 2018-09-01 ASSESSMENT — ANXIETY QUESTIONNAIRES: GAD7 TOTAL SCORE: 0

## 2018-09-01 ASSESSMENT — PATIENT HEALTH QUESTIONNAIRE - PHQ9: SUM OF ALL RESPONSES TO PHQ QUESTIONS 1-9: 1

## 2018-09-04 LAB — HCV AB SERPL QL IA: NONREACTIVE

## 2018-09-05 ENCOUNTER — TELEPHONE (OUTPATIENT)
Dept: FAMILY MEDICINE | Facility: OTHER | Age: 63
End: 2018-09-05

## 2018-09-05 DIAGNOSIS — R79.89 LOW TESTOSTERONE IN MALE: Primary | ICD-10-CM

## 2018-09-05 LAB — TESTOST SERPL-MCNC: 362 NG/DL (ref 240–950)

## 2018-09-13 ENCOUNTER — TRANSFERRED RECORDS (OUTPATIENT)
Dept: HEALTH INFORMATION MANAGEMENT | Facility: HOSPITAL | Age: 63
End: 2018-09-13

## 2018-09-26 RX ORDER — ERYTHROMYCIN 5 MG/G
1 OINTMENT OPHTHALMIC
COMMUNITY
Start: 2017-08-03 | End: 2020-03-11

## 2018-09-26 RX ORDER — IBUPROFEN 600 MG/1
600 TABLET, FILM COATED ORAL
COMMUNITY
Start: 2017-08-03 | End: 2020-03-11

## 2018-09-26 RX ORDER — ATROPINE SULFATE 10 MG/ML
1 SOLUTION/ DROPS OPHTHALMIC
COMMUNITY
Start: 2017-08-03 | End: 2020-03-11

## 2018-09-26 RX ORDER — PREDNISOLONE ACETATE 10 MG/ML
1 SUSPENSION/ DROPS OPHTHALMIC
COMMUNITY
Start: 2017-08-03 | End: 2020-03-11

## 2018-09-26 RX ORDER — ALBUTEROL SULFATE 90 UG/1
2 AEROSOL, METERED RESPIRATORY (INHALATION)
COMMUNITY
End: 2018-09-28

## 2018-09-26 NOTE — PROGRESS NOTES
[facility]  [address]  [place] [address]  [phone]  Dept: [phone]    PRE-OP EVALUATION:  Today's date: 10/3/2018    Javier Zimmer (: 1955) presents for pre-operative evaluation assessment as requested by Dr. Varghese.  He requires evaluation and anesthesia risk assessment prior to undergoing surgery/procedure for treatment of cataract, right.    Proposed Surgery/ Procedure: Cataract Removal of right eye  Date of Surgery/ Procedure: 10/23/18  Time of Surgery/ Procedure: TBD  Hospital/Surgical Facility: [facility]    Primary Physician: Mily Strickland  Type of Anesthesia Anticipated: to be determined    Patient has a Health Care Directive or Living Will:  NO    1. NO - Do you have a history of heart attack, stroke, stent, bypass or surgery on an artery in the head, neck, heart or legs?  2. NO - Do you ever have any pain or discomfort in your chest?  3. NO - Do you have a history of  Heart Failure?  4. NO - Are you troubled by shortness of breath when: walking on the level, up a slight hill or at night?  5. NO - Do you currently have a cold, bronchitis or other respiratory infection?  6. YES - DO YOU HAVE A COUGH, SHORTNESS OF BREATH OR WHEEZING? Cough from smoking  7. NO - Do you sometimes get pains in the calves of your legs when you walk?  8. NO - Do you or anyone in your family have previous history of blood clots?  9. NO - Do you or does anyone in your family have a serious bleeding problem such as prolonged bleeding following surgeries or cuts?  10. NO - Have you ever had problems with anemia or been told to take iron pills?  11. NO - Have you had any abnormal blood loss such as black, tarry or bloody stools, or abnormal vaginal bleeding?  12. NO - Have you ever had a blood transfusion?  13. NO - Have you or any of your relatives ever had problems with anesthesia?  14. NO - Do you have sleep apnea, excessive snoring or daytime drowsiness?  15. NO - Do you have  any prosthetic heart valves?  16. NO - Do you have prosthetic joints?  17. NO - Is there any chance that you may be pregnant?      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        See problem list for active medical problems.  Problems all longstanding and stable, except as noted/documented.  See ROS for pertinent symptoms related to these conditions.                                                                                                                                                          .    MEDICAL HISTORY:     Patient Active Problem List    Diagnosis Date Noted     Tobacco use disorder      Priority: Medium     Low testosterone 12/12/2016     Priority: Medium     ACP (advance care planning) 12/12/2016     Priority: Medium     Advance Care Planning 12/12/2016: ACP Review of Chart / Resources Provided:  Reviewed chart for advance care plan.  Javier DHILLON Goran has no plan or code status on file. Discussed available resources and provided with information. Confirmed code status reflects current choices pending further ACP discussions.  Confirmed/documented legally designated decision makers.  Added by Marta Espana             Benign prostatic hyperplasia with urinary obstruction 03/25/2009     Priority: Medium     Overview:   IMO Update 10/11  Updated per 10/1/17 IMO import        Past Medical History:   Diagnosis Date     Low testosterone 12/12/2016     Tobacco use disorder      Past Surgical History:   Procedure Laterality Date     BACK SURGERY      2014     HERNIA REPAIR      Over 30 years ago     Current Outpatient Prescriptions   Medication Sig Dispense Refill     albuterol (PROAIR HFA/PROVENTIL HFA/VENTOLIN HFA) 108 (90 Base) MCG/ACT inhaler Inhale 2 puffs into the lungs       atropine 1 % ophthalmic solution Apply 1 drop to eye       erythromycin (ROMYCIN) ophthalmic ointment Apply 1 strip to eye        fluticasone-salmeterol (ADVAIR DISKUS) 100-50 MCG/DOSE diskus inhaler Inhale 1 puff into the lungs       ibuprofen (ADVIL/MOTRIN) 600 MG tablet Take 600 mg by mouth       prednisoLONE acetate (PRED FORTE) 1 % ophthalmic susp Apply 1 drop to eye       sildenafil (VIAGRA) 100 MG tablet Take 1 tablet (100 mg) by mouth daily as needed 30 min to 4 hrs before sex. Do not use with nitroglycerin, terazosin or doxazosin. 12 tablet 11     OTC products: None, except as noted above    Allergies   Allergen Reactions     Bee Venom Anaphylaxis      Latex Allergy: NO    Social History   Substance Use Topics     Smoking status: Current Every Day Smoker     Packs/day: 1.00     Years: 5.00     Types: Cigarettes     Smokeless tobacco: Never Used     Alcohol use Yes      Comment: daily use- beer      History   Drug Use No       REVIEW OF SYSTEMS:   Constitutional, neuro, ENT, endocrine, pulmonary, cardiac, gastrointestinal, genitourinary, musculoskeletal, integument and psychiatric systems are negative, except as otherwise noted.    EXAM:   There were no vitals taken for this visit.    GENERAL APPEARANCE: healthy, alert and no distress     EYES: EOMI,  PERRL     HENT: ear canals and TM's normal and nose and mouth without ulcers or lesions     NECK: no adenopathy, no asymmetry, masses, or scars and thyroid normal to palpation     RESP: lungs clear to auscultation - no rales, rhonchi or wheezes     CV: regular rates and rhythm, normal S1 S2, no S3 or S4 and no murmur, click or rub     ABDOMEN:  soft, nontender, no HSM or masses and bowel sounds normal     MS: extremities normal- no gross deformities noted, no evidence of inflammation in joints, FROM in all extremities.     SKIN: no suspicious lesions or rashes     NEURO: Normal strength and tone, sensory exam grossly normal, mentation intact and speech normal     PSYCH: mentation appears normal. and affect normal/bright     LYMPHATICS: No cervical adenopathy    DIAGNOSTICS:   No labs or  EKG required for low risk surgery (cataract, skin procedure, breast biopsy, etc)    Recent Labs   Lab Test 01/30/14   POTASSIUM  4.2   CR  1.1        IMPRESSION:   Reason for surgery/procedure: right cataract extraction  Diagnosis/reason for consult: cardiopulmonary clearance    The proposed surgical procedure is considered LOW risk.    REVISED CARDIAC RISK INDEX  The patient has the following serious cardiovascular risks for perioperative complications such as (MI, PE, VFib and 3  AV Block):  No serious cardiac risks  INTERPRETATION: 0 risks: Class I (very low risk - 0.4% complication rate)    The patient has the following additional risks for perioperative complications:  No identified additional risks      ICD-10-CM    1. Preop general physical exam Z01.818        RECOMMENDATIONS:       --Patient is to take all scheduled medications on the day of surgery EXCEPT for modifications listed below.  Aspirin, NSAIDs on hold    APPROVAL GIVEN to proceed with proposed procedure, without further diagnostic evaluation     Smoking cessation advised.  Have gum at home.  Consider lung CT screening when ready.  Hold Aspirin and Ibuprofen week prior to surgery.  Tylenol is fine.  Flu shot to be done at work.    Signed Electronically by: Luly Nagy MD    Copy of this evaluation report is provided to requesting physician.    [facility] Preop Guidelines    Revised Cardiac Risk Index

## 2018-09-26 NOTE — PATIENT INSTRUCTIONS
Smoking cessation advised.  Have gum at home.  Consider lung CT screening when ready.  Hold Aspirin and Ibuprofen week prior to surgery.  Tylenol is fine.  Flu shot to be done at work.    Before Your Surgery      Call your surgeon if there is any change in your health. This includes signs of a cold or flu (such as a sore throat, runny nose, cough, rash or fever).    Do not smoke, drink alcohol or take over the counter medicine (unless your surgeon or primary care doctor tells you to) for the 24 hours before and after surgery.    If you take prescribed drugs: Follow your doctor s orders about which medicines to take and which to stop until after surgery.    Eating and drinking prior to surgery: follow the instructions from your surgeon    Take a shower or bath the night before surgery. Use the soap your surgeon gave you to gently clean your skin. If you do not have soap from your surgeon, use your regular soap. Do not shave or scrub the surgery site.  Wear clean pajamas and have clean sheets on your bed.     HOW TO QUIT SMOKING  Smoking is one of the hardest habits to break. About half of all those who have ever smoked have been able to quit, and most of those (about 70%) who still smoke want to quit. Here are some of the best ways to stop smoking.     KEEP TRYING:  It takes most smokers about 8 tries before they are finally able to fully quit. So, the more often you try and fail, the better your chance of quitting the next time! So, don't give up!    GO COLD TURKEY:  Most ex-smokers quit cold turkey. Trying to cut back gradually doesn't seem to work as well, perhaps because it continues the smoking habit. Also, it is possible to fool yourself by inhaling more while smoking fewer cigarettes. This results in the same amount of nicotine in your body!    GET SUPPORT:  Support programs can make an important difference, especially for the heavy smoker. These groups offer lectures, methods to change your behavior and peer  support. Call the free national Quitline for more information. 800-QUIT-NOW (530-842-0639). Low-cost or free programs are offered by many hospitals, local chapters of the American Lung Association (132-520-2253) and the American Cancer Society (161-681-4039). Support at home is important too. Non-smokers can help by offering praise and encouragement. If the smoker fails to quit, encourage them to try again!    OVER-THE-COUNTER MEDICINES:  For those who can't quit on their own, Nicotine Replacement Therapy (NRT) may make quitting much easier. Certain aids such as the nicotine patch, gum and lozenge are available without a prescription. However, it is best to use these under the guidance of your doctor. The skin patch provides a steady supply of nicotine to the body. Nicotine gum and lozenge gives temporary bursts of low levels of nicotine. Both methods take the edge off the craving for cigarettes. WARNING: If you feel symptoms of nicotine overdose, such as nausea, vomiting, dizziness, weakness, or fast heartbeat, stop using these and see your doctor.    PRESCRIPTION MEDICINES:  After evaluating your smoking patterns and prior attempts at quitting, your doctor may offer a prescription medicine such as bupropion (Zyban, Wellbutrin), varenicline (Chantix, Champix), a niocotine inhaler or nasal spray. Each has its unique advantage and side effects which your doctor can review with you.    HEALTH BENEFITS OF QUITTING:  The benefits of quitting start right away and keep improving the longer you go without smokin minutes: blood pressure and pulse return to normal  8 hours: oxygen levels return to normal  2 days: ability to smell and taste begins to improve as damaged nerves start to regrow  2-3 weeks: circulation and lung function improves  1-9 months: decreased cough, congestion and shortness of breath; less tired  1 year: risk of heart attack decreases by half  5 years: risk of lung cancer decreases by half; risk of  stroke becomes the same as a non-smoker  For information about how to quit smoking, visit the following links:  National Cancer [place] ,   Clearing the Air, Quit Smoking Today   - an online booklet. http://www.smokefree.gov/pubs/clearing_the_air.pdf  Smokefree.gov http://smokefree.gov/  QuitNet http://www.quitnet.com/    1844-3699 Neelima Santoro, [address] [place] [address], [place] [address]. All rights reserved. This information is not intended as a substitute for professional medical care. Always follow your healthcare professional's instructions.    The Benefits of Living Smoke Free  What do you want to gain from quitting? Check off some reasons to quit.  Health Benefits  ___ Reduce my risk of lung cancer, heart disease, chronic lung disease  ___ Have fewer wrinkles and softer skin  ___ Improve my sense of taste and smell  ___ For pregnant women--reduce the risk of having a miscarriage, stillbirth, premature birth, or low-birth-weight baby  Personal Benefits  ___ Feel more in control of my life  ___ Have better-smelling hair, breath, clothes, home, and car  ___ Save time by not having to take smoke breaks, buy cigarettes, or hunt for a light  ___ Have whiter teeth  Family Benefits  ___ Reduce my children s respiratory tract infections  ___ Set a good example for my children  ___ Reduce my family s cancer risk  Financial Benefits  ___ Save hundreds of dollars each year that would be spent on cigarettes  ___ Save money on medical bills  ___ Save on life, health, and car insurance premiums    Those Dollars Add Up!  Cigarettes are expensive, and getting more expensive all the time. Do you realize how much money you are spending on cigarettes per year? What is the average amount you spend on a pack of cigarettes? What is the average number of packs that you smoke per day? Using your answers to these questions, fill in this formula to help you find out:  ($ _____ per pack) ×  ( _____ number of packs per day) × (365  days) =  $ _____ yearly cost of smoking  Besides tobacco, there are other costs, including extra cleaning bills and replacement costs for clothing and furniture; medical expenses for smoking-related illnesses; and higher health, life, and car insurance premiums.    Cigars and Pipes Count Too!  Cigars and pipes are also dangerous. So are smokeless (chewing) tobacco and snuff. All of these products contain nicotine, a highly addictive substance that has harmful effects on your body. Quitting smoking means giving up all tobacco products.      2107-2426 [employer], [address] [place] [address], [place] [address]. All rights reserved. This information is not intended as a substitute for professional medical care. Always follow your healthcare professional's instructions.

## 2018-09-28 ENCOUNTER — OFFICE VISIT (OUTPATIENT)
Dept: SURGERY | Facility: OTHER | Age: 63
End: 2018-09-28
Attending: FAMILY MEDICINE
Payer: COMMERCIAL

## 2018-09-28 VITALS
WEIGHT: 198 LBS | TEMPERATURE: 97.5 F | DIASTOLIC BLOOD PRESSURE: 72 MMHG | OXYGEN SATURATION: 100 % | SYSTOLIC BLOOD PRESSURE: 104 MMHG | HEIGHT: 68 IN | BODY MASS INDEX: 30.01 KG/M2 | HEART RATE: 65 BPM

## 2018-09-28 DIAGNOSIS — Z12.11 SPECIAL SCREENING FOR MALIGNANT NEOPLASMS, COLON: ICD-10-CM

## 2018-09-28 PROCEDURE — 99203 OFFICE O/P NEW LOW 30 MIN: CPT | Performed by: SURGERY

## 2018-09-28 ASSESSMENT — PAIN SCALES - GENERAL: PAINLEVEL: NO PAIN (0)

## 2018-09-28 NOTE — PROGRESS NOTES
Surgery Consult Clinic Note      RE: Javier Zimmer  : 1955  CECILE: 2018      Chief Complaint:  Colon Cancer Screening     History of Present Illness:  Javier Zimmer is a very pleasant 62 year old year old male who I am seeing at the request of Mily Strickland for evaluation of screening colon malignant neoplasm and consideration for colonoscopy.  He denies family history of colon or rectal cancer, blood in stool, changes in bowel habits, weight loss, abdominal pain.  Surgical hx: inguinal hernia  He specifically denies fever, chills, nausea, vomiting, chest pain, shortness of breath or palpitations.      Medical history:  Past Medical History:   Diagnosis Date     Low testosterone 2016     Tobacco use disorder        Surgical history:  Past Surgical History:   Procedure Laterality Date     BACK SURGERY      2014     HERNIA REPAIR      Over 30 years ago       Family history:  Family History   Problem Relation Age of Onset     Breast Cancer Mother      Other Cancer Sister        Medications:  Current Outpatient Prescriptions   Medication Sig Dispense Refill     ibuprofen (ADVIL/MOTRIN) 600 MG tablet Take 600 mg by mouth       sildenafil (VIAGRA) 100 MG tablet Take 1 tablet (100 mg) by mouth daily as needed 30 min to 4 hrs before sex. Do not use with nitroglycerin, terazosin or doxazosin. 12 tablet 11     atropine 1 % ophthalmic solution Apply 1 drop to eye       erythromycin (ROMYCIN) ophthalmic ointment Apply 1 strip to eye       prednisoLONE acetate (PRED FORTE) 1 % ophthalmic susp Apply 1 drop to eye       Allergies:  The patientis allergic to bee venom.  .  Social history:  Social History   Substance Use Topics     Smoking status: Current Every Day Smoker     Packs/day: 1.00     Years: 5.00     Types: Cigarettes     Smokeless tobacco: Never Used      Comment: pt denied quit plan 18     Alcohol use Yes      Comment: daily use- beer      Marital status: .  Occupation: [employer]    Review of Systems:  10 point review of systems was obtained and negative other than what previously mentioned in HPI    Physical Examination:  /72  Pulse 65  Temp 97.5  F (36.4  C) (Tympanic)  Ht 5' 8\" (1.727 m)  Wt 198 lb (89.8 kg)  SpO2 100%  BMI 30.11 kg/m2  General: AAOx4, NAD, WN/WD, ambulating without assistance  HEENT:NCAT, EOMI, PERRL Sclerae anicteric; Trachea mideline,   Chest:  No acute distress, CTAB   Cardiac:  regular rate and rhythm, no murmur   Abdomen: non-distended   Extremities: Cursory exam unremarkable.  Skin: Warm, dry,   Neuro: no focal deficit,   Psych: happy, calm, asks appropriate questions      Assessment/Plan:  62 y.o. Male with current everyday smoker presents for first time colon cancer screening.   Satisfactory candidate for colonoscopy.  The indications, risks, benefits and technical aspects of whole colon colonoscopy were outlined with risks including, but not limited to, perforation, bleeding and inability to visualize entire colon.  Management of each was reviewed.  The need of mechanical preparation of the colon was reviewed along with the use of monitored anesthetic care.  The patient's questions were asked and answered.      Brendan Acosta MD on 9/28/2018 at 4:27 PM      "

## 2018-09-28 NOTE — NURSING NOTE
"Chief Complaint   Patient presents with     Consult     Colonoscopy/EGD  Referal  DAVID Wu       Initial /72  Pulse 65  Temp 97.5  F (36.4  C) (Tympanic)  Ht 5' 8\" (1.727 m)  Wt 198 lb (89.8 kg)  SpO2 100%  BMI 30.11 kg/m2 Estimated body mass index is 30.11 kg/(m^2) as calculated from the following:    Height as of this encounter: 5' 8\" (1.727 m).    Weight as of this encounter: 198 lb (89.8 kg).  Medication Reconciliation: complete    Maru Holm LPN    "

## 2018-09-28 NOTE — MR AVS SNAPSHOT
After Visit Summary   9/28/2018    Javier Zimmer    MRN: [id number]           Patient Information     Date Of Birth          1955        Visit Information        Provider Department      9/28/2018 3:30 PM Berndan Acosta MD [facility]        Today's Diagnoses     Special screening for malignant neoplasms, colon          Care Instructions    Thank you for allowing Dr. Acosta and our surgical team to participate in your care.  If you have a scheduling or an appointment question please contact Nory, our Health Unit Coordinator at her direct line [phone].   ALL nursing questions or concerns can be directed to your surgical nurse at: [phone]-Dominique      Please call to schedule your colonoscopy when it works for your schedule.                Follow-ups after your visit        Your next 10 appointments already scheduled     Oct 03, 2018 10:30 AM CDT   (Arrive by 10:15 AM)   Pre-Op physical with Luly Wu MD   [facility] ([facility] )    [address]  [place] [address]   [phone]              Who to contact     If you have questions or need follow up information about today's clinic visit or your schedule please contact [facility] directly at [phone].  Normal or non-critical lab and imaging results will be communicated to you by MyChart, letter or phone within 4 business days after the clinic has received the results. If you do not hear from us within 7 days, please contact the clinic through MyChart or phone. If you have a critical or abnormal lab result, we will notify you by phone as soon as possible.  Submit refill requests through [employer] or call your pharmacy and they will forward the refill request to us. Please allow 3 business days for your refill to be completed.          Additional Information About Your Visit        Care EveryWhere ID     This is your Care  "EveryWhere ID. This could be used by other organizations to access your [facility] medical records  [id number]        Your Vitals Were     Pulse Temperature Height Pulse Oximetry BMI (Body Mass Index)       65 97.5  F (36.4  C) (Tympanic) 5' 8\" (1.727 m) 100% 30.11 kg/m2        Blood Pressure from Last 3 Encounters:   09/28/18 104/72   08/31/18 102/70   08/02/17 114/70    Weight from Last 3 Encounters:   09/28/18 198 lb (89.8 kg)   08/31/18 198 lb (89.8 kg)   08/02/17 179 lb (81.2 kg)              Today, you had the following     No orders found for display         Today's Medication Changes          These changes are accurate as of 9/28/18  4:00 PM.  If you have any questions, ask your nurse or doctor.               Stop taking these medicines if you haven't already. Please contact your care team if you have questions.     ADVAIR DISKUS 100-50 MCG/DOSE diskus inhaler   Generic drug:  fluticasone-salmeterol   Stopped by:  Brendan Acosta MD           albuterol 108 (90 Base) MCG/ACT inhaler   Commonly known as:  PROAIR HFA/PROVENTIL HFA/VENTOLIN HFA   Stopped by:  Brendan Acosta MD                    Primary Care Provider Office Phone # Fax #    Mily HARPER SIOBHAN Strickland [phone]       [address] [place], [place] [id number]        Equal Access to Services     [facility] AH: Hadii yoly márquezo Soashleyali, waaxda luqadaha, qaybta kaalmada xiomy, jorge alberto peters . So [facility] [phone].    ATENCIÓN: Si habla español, tiene a mcginnis disposición servicios gratuitos de asistencia lingüística. Andre al [phone].    We comply with applicable federal civil rights laws and [place] laws. We do not discriminate on the basis of race, color, national origin, age, disability, sex, sexual orientation, or gender identity.            Thank you!     Thank you for choosing [facility]  for your care. Our goal is always to provide you with excellent care. Hearing " back from our patients is one way we can continue to improve our services. Please take a few minutes to complete the written survey that you may receive in the mail after your visit with us. Thank you!             Your Updated Medication List - Protect others around you: Learn how to safely use, store and throw away your medicines at www.disposemymeds.org.          This list is accurate as of 9/28/18  4:00 PM.  Always use your most recent med list.                   Brand Name Dispense Instructions for use Diagnosis    atropine 1 % ophthalmic solution      Apply 1 drop to eye        erythromycin ophthalmic ointment    ROMYCIN     Apply 1 strip to eye        ibuprofen 600 MG tablet    ADVIL/MOTRIN     Take 600 mg by mouth        prednisoLONE acetate 1 % ophthalmic susp    PRED FORTE     Apply 1 drop to eye        sildenafil 100 MG tablet    VIAGRA    12 tablet    Take 1 tablet (100 mg) by mouth daily as needed 30 min to 4 hrs before sex. Do not use with nitroglycerin, terazosin or doxazosin.    Erectile dysfunction, unspecified erectile dysfunction type

## 2018-09-28 NOTE — PATIENT INSTRUCTIONS
Thank you for allowing Dr. Acosta and our surgical team to participate in your care.  If you have a scheduling or an appointment question please contact Nory, our Health Unit Coordinator at her direct line [phone].   ALL nursing questions or concerns can be directed to your surgical nurse at: [phone]-Dominique      Please call to schedule your colonoscopy when it works for your schedule.

## 2018-10-03 ENCOUNTER — OFFICE VISIT (OUTPATIENT)
Dept: FAMILY MEDICINE | Facility: OTHER | Age: 63
End: 2018-10-03
Attending: FAMILY MEDICINE
Payer: COMMERCIAL

## 2018-10-03 VITALS
BODY MASS INDEX: 29.7 KG/M2 | HEIGHT: 68 IN | OXYGEN SATURATION: 98 % | SYSTOLIC BLOOD PRESSURE: 108 MMHG | TEMPERATURE: 98.2 F | HEART RATE: 78 BPM | WEIGHT: 196 LBS | DIASTOLIC BLOOD PRESSURE: 70 MMHG

## 2018-10-03 DIAGNOSIS — Z01.818 PREOP GENERAL PHYSICAL EXAM: Primary | ICD-10-CM

## 2018-10-03 DIAGNOSIS — Z71.6 TOBACCO ABUSE COUNSELING: ICD-10-CM

## 2018-10-03 DIAGNOSIS — Z72.0 TOBACCO ABUSE: ICD-10-CM

## 2018-10-03 PROCEDURE — 99214 OFFICE O/P EST MOD 30 MIN: CPT | Performed by: FAMILY MEDICINE

## 2018-10-03 ASSESSMENT — ANXIETY QUESTIONNAIRES
6. BECOMING EASILY ANNOYED OR IRRITABLE: NOT AT ALL
1. FEELING NERVOUS, ANXIOUS, OR ON EDGE: NOT AT ALL
GAD7 TOTAL SCORE: 0
5. BEING SO RESTLESS THAT IT IS HARD TO SIT STILL: NOT AT ALL
3. WORRYING TOO MUCH ABOUT DIFFERENT THINGS: NOT AT ALL
2. NOT BEING ABLE TO STOP OR CONTROL WORRYING: NOT AT ALL
7. FEELING AFRAID AS IF SOMETHING AWFUL MIGHT HAPPEN: NOT AT ALL

## 2018-10-03 ASSESSMENT — PATIENT HEALTH QUESTIONNAIRE - PHQ9: 5. POOR APPETITE OR OVEREATING: NOT AT ALL

## 2018-10-03 ASSESSMENT — PAIN SCALES - GENERAL: PAINLEVEL: NO PAIN (0)

## 2018-10-03 NOTE — NURSING NOTE
"Chief Complaint   Patient presents with     Pre-Op Exam       Initial /70  Pulse 78  Temp 98.2  F (36.8  C)  Ht 5' 8\" (1.727 m)  Wt 196 lb (88.9 kg)  SpO2 98%  BMI 29.8 kg/m2 Estimated body mass index is 29.8 kg/(m^2) as calculated from the following:    Height as of this encounter: 5' 8\" (1.727 m).    Weight as of this encounter: 196 lb (88.9 kg).  Medication Reconciliation: complete    Jody Mendoza LPN  "

## 2018-10-03 NOTE — MR AVS SNAPSHOT
After Visit Summary   10/3/2018    Javier Zimmer    MRN: [id number]           Patient Information     Date Of Birth          1955        Visit Information        Provider Department      10/3/2018 10:30 AM Luly Wu MD [facility] [place]        Today's Diagnoses     Preop general physical exam    -  1    Tobacco abuse        Tobacco abuse counseling          Care Instructions    Smoking cessation advised.  Have gum at home.  Consider lung CT screening when ready.  Hold Aspirin and Ibuprofen week prior to surgery.  Tylenol is fine.  Flu shot to be done at work.    Before Your Surgery      Call your surgeon if there is any change in your health. This includes signs of a cold or flu (such as a sore throat, runny nose, cough, rash or fever).    Do not smoke, drink alcohol or take over the counter medicine (unless your surgeon or primary care doctor tells you to) for the 24 hours before and after surgery.    If you take prescribed drugs: Follow your doctor s orders about which medicines to take and which to stop until after surgery.    Eating and drinking prior to surgery: follow the instructions from your surgeon    Take a shower or bath the night before surgery. Use the soap your surgeon gave you to gently clean your skin. If you do not have soap from your surgeon, use your regular soap. Do not shave or scrub the surgery site.  Wear clean pajamas and have clean sheets on your bed.     HOW TO QUIT SMOKING  Smoking is one of the hardest habits to break. About half of all those who have ever smoked have been able to quit, and most of those (about 70%) who still smoke want to quit. Here are some of the best ways to stop smoking.     KEEP TRYING:  It takes most smokers about 8 tries before they are finally able to fully quit. So, the more often you try and fail, the better your chance of quitting the next time! So, don't give up!    GO COLD TURKEY:  Most ex-smokers quit cold  turkey. Trying to cut back gradually doesn't seem to work as well, perhaps because it continues the smoking habit. Also, it is possible to fool yourself by inhaling more while smoking fewer cigarettes. This results in the same amount of nicotine in your body!    GET SUPPORT:  Support programs can make an important difference, especially for the heavy smoker. These groups offer lectures, methods to change your behavior and peer support. Call the free national Quitline for more information. 800-QUIT-NOW (830-864-6610). Low-cost or free programs are offered by many hospitals, local chapters of the American Lung Association (108-225-1665) and the American Cancer Society (264-865-3991). Support at home is important too. Non-smokers can help by offering praise and encouragement. If the smoker fails to quit, encourage them to try again!    OVER-THE-COUNTER MEDICINES:  For those who can't quit on their own, Nicotine Replacement Therapy (NRT) may make quitting much easier. Certain aids such as the nicotine patch, gum and lozenge are available without a prescription. However, it is best to use these under the guidance of your doctor. The skin patch provides a steady supply of nicotine to the body. Nicotine gum and lozenge gives temporary bursts of low levels of nicotine. Both methods take the edge off the craving for cigarettes. WARNING: If you feel symptoms of nicotine overdose, such as nausea, vomiting, dizziness, weakness, or fast heartbeat, stop using these and see your doctor.    PRESCRIPTION MEDICINES:  After evaluating your smoking patterns and prior attempts at quitting, your doctor may offer a prescription medicine such as bupropion (Zyban, Wellbutrin), varenicline (Chantix, Champix), a niocotine inhaler or nasal spray. Each has its unique advantage and side effects which your doctor can review with you.    HEALTH BENEFITS OF QUITTING:  The benefits of quitting start right away and keep improving the longer you go  without smokin minutes: blood pressure and pulse return to normal  8 hours: oxygen levels return to normal  2 days: ability to smell and taste begins to improve as damaged nerves start to regrow  2-3 weeks: circulation and lung function improves  1-9 months: decreased cough, congestion and shortness of breath; less tired  1 year: risk of heart attack decreases by half  5 years: risk of lung cancer decreases by half; risk of stroke becomes the same as a non-smoker  For information about how to quit smoking, visit the following links:  National Cancer [place] ,   Clearing the Air, Quit Smoking Today   - an online booklet. http://www.smokefree.gov/pubs/clearing_the_air.pdf  Smokefree.gov http://smokefree.gov/  QuitNet http://www.quitnet.com/    1302-8424 Neelima Santoro, [address] [place] [address]. All rights reserved. This information is not intended as a substitute for professional medical care. Always follow your healthcare professional's instructions.    The Benefits of Living Smoke Free  What do you want to gain from quitting? Check off some reasons to quit.  Health Benefits  ___ Reduce my risk of lung cancer, heart disease, chronic lung disease  ___ Have fewer wrinkles and softer skin  ___ Improve my sense of taste and smell  ___ For pregnant women--reduce the risk of having a miscarriage, stillbirth, premature birth, or low-birth-weight baby  Personal Benefits  ___ Feel more in control of my life  ___ Have better-smelling hair, breath, clothes, home, and car  ___ Save time by not having to take smoke breaks, buy cigarettes, or hunt for a light  ___ Have whiter teeth  Family Benefits  ___ Reduce my children s respiratory tract infections  ___ Set a good example for my children  ___ Reduce my family s cancer risk  Financial Benefits  ___ Save hundreds of dollars each year that would be spent on cigarettes  ___ Save money on medical bills  ___ Save on life, health, and car insurance  premiums    Those Dollars Add Up!  Cigarettes are expensive, and getting more expensive all the time. Do you realize how much money you are spending on cigarettes per year? What is the average amount you spend on a pack of cigarettes? What is the average number of packs that you smoke per day? Using your answers to these questions, fill in this formula to help you find out:  ($ _____ per pack) ×  ( _____ number of packs per day) × (365 days) =  $ _____ yearly cost of smoking  Besides tobacco, there are other costs, including extra cleaning bills and replacement costs for clothing and furniture; medical expenses for smoking-related illnesses; and higher health, life, and car insurance premiums.    Cigars and Pipes Count Too!  Cigars and pipes are also dangerous. So are smokeless (chewing) tobacco and snuff. All of these products contain nicotine, a highly addictive substance that has harmful effects on your body. Quitting smoking means giving up all tobacco products.      5889-4665 [facility], [address] [place] [address]. All rights reserved. This information is not intended as a substitute for professional medical care. Always follow your healthcare professional's instructions.          Follow-ups after your visit        Who to contact     If you have questions or need follow up information about today's clinic visit or your schedule please contact [facility] directly at [phone].  Normal or non-critical lab and imaging results will be communicated to you by MyChart, letter or phone within 4 business days after the clinic has received the results. If you do not hear from us within 7 days, please contact the clinic through MyChart or phone. If you have a critical or abnormal lab result, we will notify you by phone as soon as possible.  Submit refill requests through [employer] or call your pharmacy and they will forward the refill request to us. Please allow 3 business days  "for your refill to be completed.          Additional Information About Your Visit        Care EveryWhere ID     This is your Care EveryWhere ID. This could be used by other organizations to access your [facility] medical records  [id number]        Your Vitals Were     Pulse Temperature Height Pulse Oximetry BMI (Body Mass Index)       78 98.2  F (36.8  C) 5' 8\" (1.727 m) 98% 29.8 kg/m2        Blood Pressure from Last 3 Encounters:   10/03/18 108/70   09/28/18 104/72   08/31/18 102/70    Weight from Last 3 Encounters:   10/03/18 196 lb (88.9 kg)   09/28/18 198 lb (89.8 kg)   08/31/18 198 lb (89.8 kg)              We Performed the Following     Tobacco Cessation - Order to Satisfy Health Maintenance        Primary Care Provider Office Phone # Fax #    SIOBHAN Leon [phone]       [address] [place], [place] [id number]        Equal Access to Services     [facility] : Hadii aad ku hadasho Soomaali, waaxda luqadaha, qaybta kaalmada adeegyada, waxay idiin hayaan selene peters . So [facility] [phone].    ATENCIÓN: Si habla español, tiene a mcginnis disposición servicios gratuitos de asistencia lingüística. [facility] [phone].    We comply with applicable federal civil rights laws and [place] laws. We do not discriminate on the basis of race, color, national origin, age, disability, sex, sexual orientation, or gender identity.            Thank you!     Thank you for choosing [facility]  for your care. Our goal is always to provide you with excellent care. Hearing back from our patients is one way we can continue to improve our services. Please take a few minutes to complete the written survey that you may receive in the mail after your visit with us. Thank you!             Your Updated Medication List - Protect others around you: Learn how to safely use, store and throw away your medicines at www.disposemymeds.org.          This list is accurate as of 10/3/18 " 11:38 AM.  Always use your most recent med list.                   Brand Name Dispense Instructions for use Diagnosis    atropine 1 % ophthalmic solution      Apply 1 drop to eye        erythromycin ophthalmic ointment    ROMYCIN     Apply 1 strip to eye        ibuprofen 600 MG tablet    ADVIL/MOTRIN     Take 600 mg by mouth        prednisoLONE acetate 1 % ophthalmic susp    PRED FORTE     Apply 1 drop to eye        sildenafil 100 MG tablet    VIAGRA    12 tablet    Take 1 tablet (100 mg) by mouth daily as needed 30 min to 4 hrs before sex. Do not use with nitroglycerin, terazosin or doxazosin.    Erectile dysfunction, unspecified erectile dysfunction type

## 2018-10-04 ASSESSMENT — ANXIETY QUESTIONNAIRES: GAD7 TOTAL SCORE: 0

## 2018-10-04 ASSESSMENT — PATIENT HEALTH QUESTIONNAIRE - PHQ9: SUM OF ALL RESPONSES TO PHQ QUESTIONS 1-9: 1

## 2018-10-11 NOTE — H&P (VIEW-ONLY)
[facility] [place]  [address]  [place] [address]  [phone]  Dept: [phone]    PRE-OP EVALUATION:  Today's date: 10/3/2018    Javier Zimmer (: 1955) presents for pre-operative evaluation assessment as requested by Dr. Varghese.  He requires evaluation and anesthesia risk assessment prior to undergoing surgery/procedure for treatment of cataract, right.    Proposed Surgery/ Procedure: Cataract Removal of right eye  Date of Surgery/ Procedure: 10/23/18  Time of Surgery/ Procedure: TBD  Hospital/Surgical Facility: [facility]    Primary Physician: Mily Strickland  Type of Anesthesia Anticipated: to be determined    Patient has a Health Care Directive or Living Will:  NO    1. NO - Do you have a history of heart attack, stroke, stent, bypass or surgery on an artery in the head, neck, heart or legs?  2. NO - Do you ever have any pain or discomfort in your chest?  3. NO - Do you have a history of  Heart Failure?  4. NO - Are you troubled by shortness of breath when: walking on the level, up a slight hill or at night?  5. NO - Do you currently have a cold, bronchitis or other respiratory infection?  6. YES - DO YOU HAVE A COUGH, SHORTNESS OF BREATH OR WHEEZING? Cough from smoking  7. NO - Do you sometimes get pains in the calves of your legs when you walk?  8. NO - Do you or anyone in your family have previous history of blood clots?  9. NO - Do you or does anyone in your family have a serious bleeding problem such as prolonged bleeding following surgeries or cuts?  10. NO - Have you ever had problems with anemia or been told to take iron pills?  11. NO - Have you had any abnormal blood loss such as black, tarry or bloody stools, or abnormal vaginal bleeding?  12. NO - Have you ever had a blood transfusion?  13. NO - Have you or any of your relatives ever had problems with anesthesia?  14. NO - Do you have sleep apnea, excessive snoring or daytime drowsiness?  15. NO - Do you have  any prosthetic heart valves?  16. NO - Do you have prosthetic joints?  17. NO - Is there any chance that you may be pregnant?      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        See problem list for active medical problems.  Problems all longstanding and stable, except as noted/documented.  See ROS for pertinent symptoms related to these conditions.                                                                                                                                                          .    MEDICAL HISTORY:     Patient Active Problem List    Diagnosis Date Noted     Tobacco use disorder      Priority: Medium     Low testosterone 12/12/2016     Priority: Medium     ACP (advance care planning) 12/12/2016     Priority: Medium     Advance Care Planning 12/12/2016: ACP Review of Chart / Resources Provided:  Reviewed chart for advance care plan.  Javier DHILLON Goran has no plan or code status on file. Discussed available resources and provided with information. Confirmed code status reflects current choices pending further ACP discussions.  Confirmed/documented legally designated decision makers.  Added by Marta Espana             Benign prostatic hyperplasia with urinary obstruction 03/25/2009     Priority: Medium     Overview:   IMO Update 10/11  Updated per 10/1/17 IMO import        Past Medical History:   Diagnosis Date     Low testosterone 12/12/2016     Tobacco use disorder      Past Surgical History:   Procedure Laterality Date     BACK SURGERY      2014     HERNIA REPAIR      Over 30 years ago     Current Outpatient Prescriptions   Medication Sig Dispense Refill     albuterol (PROAIR HFA/PROVENTIL HFA/VENTOLIN HFA) 108 (90 Base) MCG/ACT inhaler Inhale 2 puffs into the lungs       atropine 1 % ophthalmic solution Apply 1 drop to eye       erythromycin (ROMYCIN) ophthalmic ointment Apply 1 strip to eye        fluticasone-salmeterol (ADVAIR DISKUS) 100-50 MCG/DOSE diskus inhaler Inhale 1 puff into the lungs       ibuprofen (ADVIL/MOTRIN) 600 MG tablet Take 600 mg by mouth       prednisoLONE acetate (PRED FORTE) 1 % ophthalmic susp Apply 1 drop to eye       sildenafil (VIAGRA) 100 MG tablet Take 1 tablet (100 mg) by mouth daily as needed 30 min to 4 hrs before sex. Do not use with nitroglycerin, terazosin or doxazosin. 12 tablet 11     OTC products: None, except as noted above    Allergies   Allergen Reactions     Bee Venom Anaphylaxis      Latex Allergy: NO    Social History   Substance Use Topics     Smoking status: Current Every Day Smoker     Packs/day: 1.00     Years: 5.00     Types: Cigarettes     Smokeless tobacco: Never Used     Alcohol use Yes      Comment: daily use- beer      History   Drug Use No       REVIEW OF SYSTEMS:   Constitutional, neuro, ENT, endocrine, pulmonary, cardiac, gastrointestinal, genitourinary, musculoskeletal, integument and psychiatric systems are negative, except as otherwise noted.    EXAM:   There were no vitals taken for this visit.    GENERAL APPEARANCE: healthy, alert and no distress     EYES: EOMI,  PERRL     HENT: ear canals and TM's normal and nose and mouth without ulcers or lesions     NECK: no adenopathy, no asymmetry, masses, or scars and thyroid normal to palpation     RESP: lungs clear to auscultation - no rales, rhonchi or wheezes     CV: regular rates and rhythm, normal S1 S2, no S3 or S4 and no murmur, click or rub     ABDOMEN:  soft, nontender, no HSM or masses and bowel sounds normal     MS: extremities normal- no gross deformities noted, no evidence of inflammation in joints, FROM in all extremities.     SKIN: no suspicious lesions or rashes     NEURO: Normal strength and tone, sensory exam grossly normal, mentation intact and speech normal     PSYCH: mentation appears normal. and affect normal/bright     LYMPHATICS: No cervical adenopathy    DIAGNOSTICS:   No labs or  EKG required for low risk surgery (cataract, skin procedure, breast biopsy, etc)    Recent Labs   Lab Test 01/30/14   POTASSIUM  4.2   CR  1.1        IMPRESSION:   Reason for surgery/procedure: right cataract extraction  Diagnosis/reason for consult: cardiopulmonary clearance    The proposed surgical procedure is considered LOW risk.    REVISED CARDIAC RISK INDEX  The patient has the following serious cardiovascular risks for perioperative complications such as (MI, PE, VFib and 3  AV Block):  No serious cardiac risks  INTERPRETATION: 0 risks: Class I (very low risk - 0.4% complication rate)    The patient has the following additional risks for perioperative complications:  No identified additional risks      ICD-10-CM    1. Preop general physical exam Z01.818        RECOMMENDATIONS:       --Patient is to take all scheduled medications on the day of surgery EXCEPT for modifications listed below.  Aspirin, NSAIDs on hold    APPROVAL GIVEN to proceed with proposed procedure, without further diagnostic evaluation     Smoking cessation advised.  Have gum at home.  Consider lung CT screening when ready.  Hold Aspirin and Ibuprofen week prior to surgery.  Tylenol is fine.  Flu shot to be done at work.    Signed Electronically by: Luly Nagy MD    Copy of this evaluation report is provided to requesting physician.    [facility] Preop Guidelines    Revised Cardiac Risk Index

## 2018-10-22 ENCOUNTER — ANESTHESIA EVENT (OUTPATIENT)
Dept: SURGERY | Facility: HOSPITAL | Age: 63
End: 2018-10-22
Payer: COMMERCIAL

## 2018-10-22 ASSESSMENT — COPD QUESTIONNAIRES
CAT_SEVERITY: MILD
COPD: 1

## 2018-10-22 ASSESSMENT — LIFESTYLE VARIABLES: TOBACCO_USE: 1

## 2018-10-22 NOTE — ANESTHESIA PREPROCEDURE EVALUATION
Anesthesia Evaluation     . Pt has had prior anesthetic. Type: General    No history of anesthetic complications          ROS/MED HX    ENT/Pulmonary:     (+)tobacco use, Current use 1/2-1PPD packs/day  mild COPD, , . .    Neurologic:  - neg neurologic ROS     Cardiovascular:  - neg cardiovascular ROS       METS/Exercise Tolerance:     Hematologic:  - neg hematologic  ROS       Musculoskeletal:  - neg musculoskeletal ROS       GI/Hepatic:  - neg GI/hepatic ROS       Renal/Genitourinary:  - ROS Renal section negative       Endo:  - neg endo ROS       Psychiatric:  - neg psychiatric ROS       Infectious Disease:         Malignancy:      - no malignancy   Other:    - neg other ROS                 Physical Exam  Normal systems: dental    Airway   Mallampati: II  TM distance: >3 FB  Neck ROM: full    Dental     Cardiovascular   Rhythm and rate: regular and normal      Pulmonary    breath sounds clear to auscultation                    Anesthesia Plan      History & Physical Review  History and physical reviewed and following examination; no interval change.    ASA Status:  3 .    NPO Status:  > 8 hours    Plan for MAC with Intravenous induction. Reason for MAC:  Procedure to face, neck, head or breast         Postoperative Care      Consents  Anesthetic plan, risks, benefits and alternatives discussed with:  Patient..                          .

## 2018-10-23 ENCOUNTER — SURGERY (OUTPATIENT)
Age: 63
End: 2018-10-23

## 2018-10-23 ENCOUNTER — ANESTHESIA (OUTPATIENT)
Dept: SURGERY | Facility: HOSPITAL | Age: 63
End: 2018-10-23
Payer: COMMERCIAL

## 2018-10-23 ENCOUNTER — HOSPITAL ENCOUNTER (OUTPATIENT)
Facility: HOSPITAL | Age: 63
Discharge: HOME | End: 2018-10-23
Attending: OPHTHALMOLOGY | Admitting: OPHTHALMOLOGY
Payer: COMMERCIAL

## 2018-10-23 VITALS
BODY MASS INDEX: 28.95 KG/M2 | HEIGHT: 68 IN | DIASTOLIC BLOOD PRESSURE: 80 MMHG | TEMPERATURE: 98.8 F | SYSTOLIC BLOOD PRESSURE: 121 MMHG | RESPIRATION RATE: 16 BRPM | WEIGHT: 191 LBS | OXYGEN SATURATION: 97 %

## 2018-10-23 PROCEDURE — 25000125 ZZHC RX 250: Performed by: OPHTHALMOLOGY

## 2018-10-23 PROCEDURE — V2632 POST CHMBR INTRAOCULAR LENS: HCPCS | Performed by: OPHTHALMOLOGY

## 2018-10-23 PROCEDURE — 25000128 H RX IP 250 OP 636: Performed by: NURSE ANESTHETIST, CERTIFIED REGISTERED

## 2018-10-23 PROCEDURE — 37000008 ZZH ANESTHESIA TECHNICAL FEE, 1ST 30 MIN: Performed by: OPHTHALMOLOGY

## 2018-10-23 PROCEDURE — 25000128 H RX IP 250 OP 636: Performed by: OPHTHALMOLOGY

## 2018-10-23 PROCEDURE — 27210794 ZZH OR GENERAL SUPPLY STERILE: Performed by: OPHTHALMOLOGY

## 2018-10-23 PROCEDURE — C9447 INJ, PHENYLEPHRINE KETOROLAC: HCPCS | Performed by: OPHTHALMOLOGY

## 2018-10-23 PROCEDURE — 71000027 ZZH RECOVERY PHASE 2 EACH 15 MINS: Performed by: OPHTHALMOLOGY

## 2018-10-23 PROCEDURE — 25000132 ZZH RX MED GY IP 250 OP 250 PS 637: Performed by: OPHTHALMOLOGY

## 2018-10-23 PROCEDURE — 01999 UNLISTED ANES PROCEDURE: CPT | Performed by: NURSE ANESTHETIST, CERTIFIED REGISTERED

## 2018-10-23 PROCEDURE — 40000306 ZZH STATISTIC PRE PROC ASSESS II: Performed by: OPHTHALMOLOGY

## 2018-10-23 PROCEDURE — 66984 XCAPSL CTRC RMVL W/O ECP: CPT | Performed by: ANESTHESIOLOGY

## 2018-10-23 PROCEDURE — 36000056 ZZH SURGERY LEVEL 3 1ST 30 MIN: Performed by: OPHTHALMOLOGY

## 2018-10-23 DEVICE — LENS-SOFPORT 14.5: Type: IMPLANTABLE DEVICE | Site: EYE | Status: FUNCTIONAL

## 2018-10-23 RX ORDER — NALOXONE HYDROCHLORIDE 0.4 MG/ML
.1-.4 INJECTION, SOLUTION INTRAMUSCULAR; INTRAVENOUS; SUBCUTANEOUS
Status: DISCONTINUED | OUTPATIENT
Start: 2018-10-23 | End: 2018-10-23 | Stop reason: HOSPADM

## 2018-10-23 RX ORDER — PILOCARPINE HYDROCHLORIDE 10 MG/ML
SOLUTION/ DROPS OPHTHALMIC PRN
Status: DISCONTINUED | OUTPATIENT
Start: 2018-10-23 | End: 2018-10-23 | Stop reason: HOSPADM

## 2018-10-23 RX ORDER — ACETAMINOPHEN 325 MG/1
650 TABLET ORAL ONCE
Status: COMPLETED | OUTPATIENT
Start: 2018-10-23 | End: 2018-10-23

## 2018-10-23 RX ORDER — LEVOBUNOLOL HYDROCHLORIDE 5 MG/ML
SOLUTION/ DROPS OPHTHALMIC PRN
Status: DISCONTINUED | OUTPATIENT
Start: 2018-10-23 | End: 2018-10-23 | Stop reason: HOSPADM

## 2018-10-23 RX ORDER — ONDANSETRON 4 MG/1
4 TABLET, ORALLY DISINTEGRATING ORAL EVERY 30 MIN PRN
Status: DISCONTINUED | OUTPATIENT
Start: 2018-10-23 | End: 2018-10-23 | Stop reason: HOSPADM

## 2018-10-23 RX ORDER — TETRACAINE HYDROCHLORIDE 5 MG/ML
SOLUTION OPHTHALMIC PRN
Status: DISCONTINUED | OUTPATIENT
Start: 2018-10-23 | End: 2018-10-23 | Stop reason: HOSPADM

## 2018-10-23 RX ORDER — SODIUM CHLORIDE, SODIUM LACTATE, POTASSIUM CHLORIDE, CALCIUM CHLORIDE 600; 310; 30; 20 MG/100ML; MG/100ML; MG/100ML; MG/100ML
INJECTION, SOLUTION INTRAVENOUS CONTINUOUS
Status: DISCONTINUED | OUTPATIENT
Start: 2018-10-23 | End: 2018-10-23 | Stop reason: HOSPADM

## 2018-10-23 RX ORDER — CYCLOPENTOLATE HYDROCHLORIDE 20 MG/ML
1 SOLUTION/ DROPS OPHTHALMIC
Status: COMPLETED | OUTPATIENT
Start: 2018-10-23 | End: 2018-10-23

## 2018-10-23 RX ORDER — ONDANSETRON 2 MG/ML
4 INJECTION INTRAMUSCULAR; INTRAVENOUS EVERY 30 MIN PRN
Status: DISCONTINUED | OUTPATIENT
Start: 2018-10-23 | End: 2018-10-23 | Stop reason: HOSPADM

## 2018-10-23 RX ORDER — MOXIFLOXACIN 5 MG/ML
SOLUTION/ DROPS OPHTHALMIC PRN
Status: DISCONTINUED | OUTPATIENT
Start: 2018-10-23 | End: 2018-10-23 | Stop reason: HOSPADM

## 2018-10-23 RX ORDER — MEPERIDINE HYDROCHLORIDE 50 MG/ML
12.5 INJECTION INTRAMUSCULAR; INTRAVENOUS; SUBCUTANEOUS
Status: DISCONTINUED | OUTPATIENT
Start: 2018-10-23 | End: 2018-10-23 | Stop reason: HOSPADM

## 2018-10-23 RX ORDER — LIDOCAINE HYDROCHLORIDE 10 MG/ML
INJECTION, SOLUTION EPIDURAL; INFILTRATION; INTRACAUDAL; PERINEURAL PRN
Status: DISCONTINUED | OUTPATIENT
Start: 2018-10-23 | End: 2018-10-23 | Stop reason: HOSPADM

## 2018-10-23 RX ORDER — PROPARACAINE HYDROCHLORIDE 5 MG/ML
1 SOLUTION/ DROPS OPHTHALMIC ONCE
Status: COMPLETED | OUTPATIENT
Start: 2018-10-23 | End: 2018-10-23

## 2018-10-23 RX ORDER — PHENYLEPHRINE HYDROCHLORIDE 25 MG/ML
1 SOLUTION/ DROPS OPHTHALMIC
Status: COMPLETED | OUTPATIENT
Start: 2018-10-23 | End: 2018-10-23

## 2018-10-23 RX ORDER — PHENYLEPHRINE HYDROCHLORIDE 100 MG/ML
1 SOLUTION/ DROPS OPHTHALMIC
Status: DISCONTINUED | OUTPATIENT
Start: 2018-10-23 | End: 2018-10-23 | Stop reason: HOSPADM

## 2018-10-23 RX ADMIN — PHENYLEPHRINE HYDROCHLORIDE 1 DROP: 25 SOLUTION/ DROPS OPHTHALMIC at 09:07

## 2018-10-23 RX ADMIN — TETRACAINE HYDROCHLORIDE 2 DROP: 5 SOLUTION OPHTHALMIC at 10:10

## 2018-10-23 RX ADMIN — PILOCARPINE HYDROCHLORIDE 1 DROP: 10 SOLUTION/ DROPS OPHTHALMIC at 10:10

## 2018-10-23 RX ADMIN — LIDOCAINE HYDROCHLORIDE 1 ML: 10 INJECTION, SOLUTION EPIDURAL; INFILTRATION; INTRACAUDAL; PERINEURAL at 10:09

## 2018-10-23 RX ADMIN — MOXIFLOXACIN HYDROCHLORIDE 1 DROP: 5 SOLUTION/ DROPS OPHTHALMIC at 10:09

## 2018-10-23 RX ADMIN — PROPARACAINE HYDROCHLORIDE 1 DROP: 5 SOLUTION/ DROPS OPHTHALMIC at 09:02

## 2018-10-23 RX ADMIN — MOXIFLOXACIN 0.5 ML: 5 SOLUTION/ DROPS OPHTHALMIC at 09:07

## 2018-10-23 RX ADMIN — Medication 0.8 ML: at 10:10

## 2018-10-23 RX ADMIN — PHENYLEPHRINE HYDROCHLORIDE 1 DROP: 25 SOLUTION/ DROPS OPHTHALMIC at 09:02

## 2018-10-23 RX ADMIN — CYCLOPENTOLATE HYDROCHLORIDE 1 DROP: 20 SOLUTION/ DROPS OPHTHALMIC at 09:07

## 2018-10-23 RX ADMIN — CYCLOPENTOLATE HYDROCHLORIDE 1 DROP: 20 SOLUTION/ DROPS OPHTHALMIC at 09:02

## 2018-10-23 RX ADMIN — PHENYLEPHRINE AND KETOROLAC 300 ML: 10.16; 2.88 INJECTION, SOLUTION, CONCENTRATE INTRAOCULAR at 10:10

## 2018-10-23 RX ADMIN — ACETAMINOPHEN 650 MG: 325 TABLET, FILM COATED ORAL at 08:59

## 2018-10-23 RX ADMIN — MIDAZOLAM 1 MG: 1 INJECTION INTRAMUSCULAR; INTRAVENOUS at 09:55

## 2018-10-23 RX ADMIN — LEVOBUNOLOL HYDROCHLORIDE 1 DROP: 5 SOLUTION/ DROPS OPHTHALMIC at 10:09

## 2018-10-23 NOTE — ANESTHESIA POSTPROCEDURE EVALUATION
Patient: Javier Zimmer    Procedure(s):  PHACOEMULSIFICATION CATARACT EXTRACTION POSTERIOR CHAMBER LENS RIGHT    Diagnosis:COMBINED FORMS OF AGE RELATED CATARACT RIGHT EYE  Diagnosis Additional Information: No value filed.    Anesthesia Type:  MAC    Note:  Anesthesia Post Evaluation    Patient location during evaluation: PACU  Patient participation: Able to fully participate in evaluation  Level of consciousness: awake and alert  Pain management: adequate  Airway patency: patent  Cardiovascular status: acceptable  Respiratory status: acceptable  Hydration status: acceptable  PONV: none             Last vitals:  Vitals:    10/23/18 1035 10/23/18 1040 10/23/18 1045   BP: 116/97 112/79 121/80   Resp: 18 16 16   Temp:      SpO2: 96% 96% 97%         Electronically Signed By: Dominic Smith MD  October 23, 2018  11:10 AM

## 2018-10-23 NOTE — IP AVS SNAPSHOT
[facility]/Phase II    [address] [address]    Phone:  [phone]                                       After Visit Summary   10/23/2018    Javier Zimmer    MRN: [id number]           After Visit Summary Signature Page     I have received my discharge instructions, and my questions have been answered. I have discussed any challenges I see with this plan with the nurse or doctor.    ..........................................................................................................................................  Patient/Patient Representative Signature      ..........................................................................................................................................  Patient Representative Print Name and Relationship to Patient    ..................................................               ................................................  Date                                   Time    ..........................................................................................................................................  Reviewed by Signature/Title    ...................................................              ..............................................  Date                                               Time          22EPIC Rev 08/18

## 2018-10-23 NOTE — ANESTHESIA CARE TRANSFER NOTE
Patient: Javier Zimmer    Procedure(s):  PHACOEMULSIFICATION CATARACT EXTRACTION POSTERIOR CHAMBER LENS RIGHT    Diagnosis: COMBINED FORMS OF AGE RELATED CATARACT RIGHT EYE  Diagnosis Additional Information: No value filed.    Anesthesia Type:   MAC     Note:  Airway :Nasal Cannula  Patient transferred to:Phase II  Handoff Report: Identifed the Patient, Identified the Reponsible Provider, Reviewed the pertinent medical history, Discussed the surgical course, Reviewed Intra-OP anesthesia mangement and issues during anesthesia, Set expectations for post-procedure period and Allowed opportunity for questions and acknowledgement of understanding      Vitals: (Last set prior to Anesthesia Care Transfer)    CRNA VITALS  10/23/2018 0951 - 10/23/2018 1021      10/23/2018             Resp Rate (observed): (!)  1    Resp Rate (set): 8                Electronically Signed By: EUGENIA Harkins CRNA  October 23, 2018  10:21 AM

## 2018-10-23 NOTE — IP AVS SNAPSHOT
MRN:[id number]                      After Visit Summary   10/23/2018    Javier Zimmer    MRN: [id number]           Thank you!     Thank you for choosing [facility] for your care. Our goal is always to provide you with excellent care. Hearing back from our patients is one way we can continue to improve our services. Please take a few minutes to complete the written survey that you may receive in the mail after you visit with us. Thank you!        Patient Information     Date Of Birth          1955        About your hospital stay     You were admitted on:  October 23, 2018 You last received care in the:  [facility] Preop/Phase II    You were discharged on:  October 23, 2018        Reason for your hospital stay       Cataract surgery right eye done.                  Who to Call     For medical emergencies, please call 911.  For non-urgent questions about your medical care, please call your primary care provider or clinic, [phone]  For questions related to your surgery, please call your surgery clinic        Attending Provider     Provider Specialty    Mac Varghese MD Ophthalmology       Primary Care Provider Office Phone # Fax #    Luly Wu [phone]      After Care Instructions     Nursing Communication 1       Eyedrops, shield, and activities per post op pamphlet.            Patient care order       Patient has Vigamox and Durezol and Ilevro drops at home.  These should be used:  Vigamox 1 drop operative eye QID for 1 week.  Durezol 1 drop operative eye BID for 4 weeks.  Ilevro 1 drop operative eye QD for 6 weeks.                  Follow-up Appointments     Follow-up and recommended labs and tests       Follow up tomorrow at the [facility].                  Further instructions from your care team           Post-Anesthesia Patient Instructions    IMMEDIATELY FOLLOWING SURGERY:  Do not drive or operate machinery for the first twenty four hours after  "surgery.  Do not make any important decisions for twenty four hours after surgery or while taking narcotic pain medications or sedatives.  If you develop intractable nausea and vomiting or a severe headache please notify your doctor immediately.    FOLLOW-UP:  Please make an appointment with your surgeon as instructed. You do not need to follow up with anesthesia unless specifically instructed to do so.    WOUND CARE INSTRUCTIONS (if applicable):  Keep a dry clean dressing on the anesthesia/puncture wound site if there is drainage.  Once the wound has quit draining you may leave it open to air.  Generally you should leave the bandage intact for twenty four hours unless there is drainage.  If the epidural site drains for more than 36-48 hours please call the anesthesia department.    QUESTIONS?:  Please feel free to call your physician or the hospital  if you have any questions, and they will be happy to assist you.       Pending Results     No orders found from 10/21/2018 to 10/24/2018.            Admission Information     Date & Time Provider Department Dept. Phone    10/23/2018 Mac Varghese MD [facility] Preop/Phase [phone]      Your Vitals Were     Blood Pressure Temperature Respirations Height Weight Pulse Oximetry    121/80 98.8  F (37.1  C) (Oral) 16 1.727 m (5' 8\") 86.6 kg (191 lb) 97%    BMI (Body Mass Index)                   29.04 kg/m2           Care EveryWhere ID     This is your Care EveryWhere ID. This could be used by other organizations to access your [facility] medical records  [id number]        Equal Access to Services     AUBREY VILLALTA AH: Hadii yoly woodard Somadeleine, waaxda luqadaha, qaybta kaalmada adefransiscoyada, jorge alberto oswald. So [facility] [phone].    ATENCIÓN: Si habla español, tiene a mcginnis disposición servicios gratuitos de asistencia lingüística. Andre al [phone].    We comply with applicable federal civil rights laws and [place] laws. We do not " discriminate on the basis of race, color, national origin, age, disability, sex, sexual orientation, or gender identity.               Review of your medicines      CONTINUE these medicines which have NOT CHANGED        Dose / Directions    atropine 1 % ophthalmic solution        Dose:  1 drop   Apply 1 drop to eye   Refills:  0       erythromycin ophthalmic ointment   Commonly known as:  ROMYCIN        Dose:  1 strip   Apply 1 strip to eye   Refills:  0       ibuprofen 600 MG tablet   Commonly known as:  ADVIL/MOTRIN        Dose:  600 mg   Take 600 mg by mouth   Refills:  0       prednisoLONE acetate 1 % ophthalmic susp   Commonly known as:  PRED FORTE        Dose:  1 drop   Apply 1 drop to eye   Refills:  0       sildenafil 100 MG tablet   Commonly known as:  VIAGRA   Used for:  Erectile dysfunction, unspecified erectile dysfunction type        Dose:  100 mg   Take 1 tablet (100 mg) by mouth daily as needed 30 min to 4 hrs before sex. Do not use with nitroglycerin, terazosin or doxazosin.   Quantity:  12 tablet   Refills:  11                Protect others around you: Learn how to safely use, store and throw away your medicines at www.disposemymeds.org.             Medication List: This is a list of all your medications and when to take them. Check marks below indicate your daily home schedule. Keep this list as a reference.      Medications           Morning Afternoon Evening Bedtime As Needed    atropine 1 % ophthalmic solution   Apply 1 drop to eye                                erythromycin ophthalmic ointment   Commonly known as:  ROMYCIN   Apply 1 strip to eye                                ibuprofen 600 MG tablet   Commonly known as:  ADVIL/MOTRIN   Take 600 mg by mouth                                prednisoLONE acetate 1 % ophthalmic susp   Commonly known as:  PRED FORTE   Apply 1 drop to eye                                sildenafil 100 MG tablet   Commonly known as:  VIAGRA   Take 1 tablet (100 mg) by  mouth daily as needed 30 min to 4 hrs before sex. Do not use with nitroglycerin, terazosin or doxazosin.

## 2018-10-23 NOTE — OP NOTE
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  Lidocaine 2% jelly topically and Lidocaine 1% preservative-free intracamerally.     PROCEDURE:  The patient was brought to the Operating Room and prepped and draped in a sterile manner.  A wire lid speculum was placed.  A paracentesis was created and 1% Lidocaine was instilled in the anterior chamber.  The anterior chamber was then filled with Amvisc viscoelastic.  A clear cornea temporal wound was created using a 2.8 mm keratome.  A cystotome was used to initiate a flap in the anterior capsule and a Utrata forceps was used to create a continuous tear capsulorhexis.  Hydrodissection was performed.  The phacoemulsification tip was inserted into the eye and the nucleus and epinucleus were removed using a divide and conquer technique.  The residual cortex was removed using the I/A handpiece.  Dense posterior capsule opacity noted.  Used capsule polisher.  The anterior chamber was then refilled with viscoelastic and the wound was enlarged with the keratome.  The intraocular lens, 14.5 diopter, Model LI61AO, was placed into the injector and injected into the capsular bag. It was checked to make sure that it was central and stable.  Residual viscoelastic was removed using the I/A.  The anterior chamber was refilled with BSS.  The wounds were hydrated with BSS and were noted to be watertight with no suture necessary.  Topical  pilocarpine 1%, Betagan, and Vigamox was applied.  A hard shield was placed.     The patient tolerated the procedure well and was sent to the Recovery Room in satisfactory condition.

## 2018-10-23 NOTE — OR NURSING
"Eating, drinking, and tolerating well. States pain is 2/10 in right eye. \"More just uncomfortable than anything.\" Discharge instuctions discussed with Pt and wife. Dressed independently, and escorted to SDS exit via ambulatory. Marvin 19.    "

## 2019-08-07 ENCOUNTER — OFFICE VISIT (OUTPATIENT)
Dept: CHIROPRACTIC MEDICINE | Facility: OTHER | Age: 64
End: 2019-08-07
Attending: CHIROPRACTOR
Payer: COMMERCIAL

## 2019-08-07 DIAGNOSIS — M99.03 SEGMENTAL AND SOMATIC DYSFUNCTION OF LUMBAR REGION: ICD-10-CM

## 2019-08-07 DIAGNOSIS — M54.50 ACUTE BILATERAL LOW BACK PAIN WITHOUT SCIATICA: ICD-10-CM

## 2019-08-07 DIAGNOSIS — M99.02 SEGMENTAL AND SOMATIC DYSFUNCTION OF THORACIC REGION: ICD-10-CM

## 2019-08-07 PROCEDURE — 98940 CHIROPRACT MANJ 1-2 REGIONS: CPT | Mod: AT | Performed by: CHIROPRACTOR

## 2019-08-07 PROCEDURE — 99212 OFFICE O/P EST SF 10 MIN: CPT | Mod: 25 | Performed by: CHIROPRACTOR

## 2019-08-08 NOTE — PROGRESS NOTES
Subjective Finding:    Chief compalint: Patient presents with:  Back Pain  , Pain Scale: 6/10, Intensity: sharp, Duration: 1 months, Radiating: no.    Date of injury:     Activities that the pain restricts:   Home/household/hobbies/social activities: yes.  Work duties: yes.  Sleep: no.  Makes symptoms better: rest.  Makes symptoms worse: activity and lumbar flexion.  Have you seen anyone else for the symptoms? No.  Work related: no.  Automobile related injury: no.    Objective and Assessment:    Posture Analysis:   High shoulder: .  Head tilt: .  High iliac crest: .  Head carriage: neutral.  Thoracic Kyphosis: neutral.  Lumbar Lordosis: forward.    Lumbar Range of Motion: extension decreased, left lateral flexion decreased and right lateral flexion decreased.  Cervical Range of Motion: .  Thoracic Range of Motion: .  Extremity Range of Motion: .    Palpation:   Quad lumb: bilateral, referred pain: no    Segmental dysfunction pre-treatment and treatment area: T7, L3, L4 and L5.    Assessment post-treatment:  Cervical: .  Thoracic: ROM increased.  Lumbar: ROM increased.    Comments: past low back surgery.      Complicating Factors: structural abnormalities.    Procedure(s):  [facility]:  [address] Chiropractic manipulative treatment 1-2 regions performed   Thoracic: Diversified, See above for level, Prone and Lumbar: Diversified, See above for level, Side posture    Modalities:  None performed this visit    Therapeutic procedures:  None    Plan:  Treatment plan: 2 times per week for 2 weeks.  Instructed patient: stretch as instructed at visit.  Short term goals: reduce pain.  Long term goals: increase ADL.  Prognosis: excellent.

## 2020-03-10 NOTE — PROGRESS NOTES
Subjective     Javier Zimmer is a 64 year old male who presents to clinic today for the following health issues:    HPI   RESPIRATORY SYMPTOMS  Cough/Sore throat    Duration: states has been going on for months; no acute change    Description  sore throat and cough    Severity: moderate    Accompanying signs and symptoms: None    History (predisposing factors):  tobacco abuse    Precipitating or alleviating factors: None    Therapies tried and outcome:  rest and fluids oral decongestant      History of sinus and allergy issue (weeds, molds); chronic clearing; facial pressure; no ear symptoms; 1-2 times per weeks of sinus OTC medications; chronic cough drop use    Prior saline spray - not consistent; no nose sprays; afrin    Exposed to smoke from wood stove daily    Current pack/day for past 10 years; but smoked prior; had quit for 15 years; started teens/20s    Wakes up coughing in middle of night    Random coughing throughout day    Sore throat for months    Non-productive    No pain, no chest pain, no exertional symptoms    No shortness of breath; occasional wheezing    No light headedness or dizziness    No edema    No fever, chills, sweats    No reflux/heartburn    Fatigued    Remote diagnosis asthma; prior coughing fits; treated with prednisone    No inhaler use      Derm Problem    Duration: about a month    Description (location/character/radiation): both shins have been extremely itchy.     Intensity:  mild    Accompanying signs and symptoms: slight redness from itching    History (similar episodes/previous evaluation): None    Precipitating or alleviating factors: None    Therapies tried and outcome: tried moisturizing lotion but it burned like crazy    Itching wakes in middle of night and after shower    Localized to both shins     Meds         Review of Systems   ROS COMP: Constitutional, HEENT, cardiovascular, pulmonary, gi and gu systems are negative, except as otherwise noted.       Objective     /74   Pulse 84   Temp 98.3  F (36.8  C) (Tympanic)   Resp 19   Wt 90.7 kg (200 lb)   SpO2 98%   BMI 30.41 kg/m    Body mass index is 30.41 kg/m .  Physical Exam   GENERAL: healthy, alert and no distress  EYES: Eyes grossly normal to inspection, PERRL and conjunctivae and sclerae normal  HENT: ear canals and TM's normal, significant cerumen in right canal, nose and mouth without ulcers or lesions  NECK: no adenopathy, no asymmetry, masses, or scars  RESP: lungs clear to auscultation - no rales, rhonchi or wheezes  CV: regular rate and rhythm, S3 present, grade 2/6 systolic murmur heard best over the right sternal border second intercostal space, peripheral pulses strong and no peripheral edema  ABDOMEN: soft, nontender, no hepatosplenomegaly, no masses and bowel sounds normal  MS: no gross musculoskeletal defects noted, no edema  SKIN: blanchable erythema on bilateral shins, non-palpable, no weeping/discharge/sores  NEURO: Normal strength and tone, mentation intact and speech normal  PSYCH: mentation appears normal, affect normal/bright    Diagnostic Test Results:  Labs reviewed in Epic  Future labs ordered.    Xray chest done.  Clear.        Assessment & Plan       ICD-10-CM    1. Chronic cough  R05 XR Chest 2 Views     fluticasone (FLONASE) 50 MCG/ACT nasal spray     CBC with platelets and differential     Comprehensive metabolic panel (BMP + Alb, Alk Phos, ALT, AST, Total. Bili, TP)     TSH with free T4 reflex     Echocardiogram Complete   2. Post-nasal drip  R09.82 XR Chest 2 Views     fluticasone (FLONASE) 50 MCG/ACT nasal spray   3. Tobacco use disorder  F17.200 XR Chest 2 Views   4. Encounter for tobacco use cessation counseling  Z71.6    5. Lipid screening  Z13.220 Lipid Profile (Chol, Trig, HDL, LDL calc)   6. Screening for prostate cancer  Z12.5 PSA, screen   7. Fatigue, unspecified type  R53.83 CBC with platelets and differential     TSH with free T4 reflex   8. Low testosterone  R79.89  Testosterone Free and Total   9. Screening for diabetes mellitus  Z13.1 Comprehensive metabolic panel (BMP + Alb, Alk Phos, ALT, AST, Total. Bili, TP)   10. Heart murmur  R01.1 Echocardiogram Complete        Tobacco Cessation:   reports that he has been smoking cigarettes. He has a 5.00 pack-year smoking history. He has never used smokeless tobacco.  Tobacco Cessation Action Plan: Information offered: Patient not interested at this time    Patient overdue for health maintenance.  Chronic cough with broad differential.  Post nasal drip, gerd, asthma/copd.  Given tobacco use - obtain xray.  If cough not improving, may warrant CT chest.   If cough improving, would still qualify for low dose lung CT screening for lung cancer.  Symptoms most consistent with post nasal drip.  Clears throat throughout visit.  Start with nasal saline and steroids.  If not responding, consider further evaluation: trial of PPI/H2 blocker, PFTs/inhaler.    Murmur noted with splitting of S2.  Cardiac cause of fatigue, cough?  Echo ordered.    Rash non-specific. Mostly localized pruritis.  Good emollients, limited topical steroid.    Patient Instructions   Xray today - will call with result.  Trial of sinus saline rinses - neti pot or Wade med rinses - twice daily.  Nightly Flonase nasal spray - use regularly - takes time to work.  Smoking cessation advised.    Echocardiogram to evaluate murmur.    Return for morning fasting labs.  Schedule physical at your convenience.    Apply cream to rash such as - Cetaphil, Vanicream, or Sarna anti-itch.  Ok to use topical steroid cream such as Cortisone twice daily as needed.      No follow-ups on file.    Luly Nagy MD  [facility] - [place]

## 2020-03-11 ENCOUNTER — ANCILLARY PROCEDURE (OUTPATIENT)
Dept: GENERAL RADIOLOGY | Facility: OTHER | Age: 65
End: 2020-03-11
Attending: FAMILY MEDICINE
Payer: COMMERCIAL

## 2020-03-11 ENCOUNTER — OFFICE VISIT (OUTPATIENT)
Dept: FAMILY MEDICINE | Facility: OTHER | Age: 65
End: 2020-03-11
Attending: FAMILY MEDICINE
Payer: COMMERCIAL

## 2020-03-11 VITALS
HEART RATE: 84 BPM | SYSTOLIC BLOOD PRESSURE: 126 MMHG | WEIGHT: 200 LBS | DIASTOLIC BLOOD PRESSURE: 74 MMHG | BODY MASS INDEX: 30.41 KG/M2 | RESPIRATION RATE: 19 BRPM | TEMPERATURE: 98.3 F | OXYGEN SATURATION: 98 %

## 2020-03-11 DIAGNOSIS — F17.200 TOBACCO USE DISORDER: ICD-10-CM

## 2020-03-11 DIAGNOSIS — R09.82 POST-NASAL DRIP: ICD-10-CM

## 2020-03-11 DIAGNOSIS — R05.3 CHRONIC COUGH: Primary | ICD-10-CM

## 2020-03-11 DIAGNOSIS — Z13.1 SCREENING FOR DIABETES MELLITUS: ICD-10-CM

## 2020-03-11 DIAGNOSIS — Z12.5 SCREENING FOR PROSTATE CANCER: ICD-10-CM

## 2020-03-11 DIAGNOSIS — R05.3 CHRONIC COUGH: ICD-10-CM

## 2020-03-11 DIAGNOSIS — R79.89 LOW TESTOSTERONE: ICD-10-CM

## 2020-03-11 DIAGNOSIS — R53.83 FATIGUE, UNSPECIFIED TYPE: ICD-10-CM

## 2020-03-11 DIAGNOSIS — Z13.220 LIPID SCREENING: ICD-10-CM

## 2020-03-11 DIAGNOSIS — R01.1 HEART MURMUR: ICD-10-CM

## 2020-03-11 DIAGNOSIS — Z71.6 ENCOUNTER FOR TOBACCO USE CESSATION COUNSELING: ICD-10-CM

## 2020-03-11 PROCEDURE — 71046 X-RAY EXAM CHEST 2 VIEWS: CPT | Mod: TC

## 2020-03-11 PROCEDURE — 99214 OFFICE O/P EST MOD 30 MIN: CPT | Performed by: FAMILY MEDICINE

## 2020-03-11 RX ORDER — FLUTICASONE PROPIONATE 50 MCG
1 SPRAY, SUSPENSION (ML) NASAL DAILY
Qty: 16 G | Refills: 3 | Status: SHIPPED | OUTPATIENT
Start: 2020-03-11

## 2020-03-11 ASSESSMENT — PAIN SCALES - GENERAL: PAINLEVEL: NO PAIN (0)

## 2020-03-11 NOTE — PATIENT INSTRUCTIONS
Xray today - will call with result.  Trial of sinus saline rinses - neti pot or Wade med rinses - twice daily.  Nightly Flonase nasal spray - use regularly - takes time to work.  Smoking cessation advised.    Echocardiogram to evaluate murmur.    Return for morning fasting labs.  Schedule physical at your convenience.    Apply cream to rash such as - Cetaphil, Vanicream, or Sarna anti-itch.  Ok to use topical steroid cream such as Cortisone twice daily as needed.

## 2020-03-11 NOTE — NURSING NOTE
"Chief Complaint   Patient presents with     Cough     Derm Problem     Flu Shot     declined     Imm/Inj     declined all vaccines today     Smoking Cessation     declined       Initial /74   Pulse 84   Temp 98.3  F (36.8  C) (Tympanic)   Resp 19   Wt 90.7 kg (200 lb)   SpO2 98%   BMI 30.41 kg/m   Estimated body mass index is 30.41 kg/m  as calculated from the following:    Height as of 10/23/18: 1.727 m (5' 8\").    Weight as of this encounter: 90.7 kg (200 lb).  Medication Reconciliation: complete  Enedina Melissa LPN    "

## 2020-04-01 ENCOUNTER — HOSPITAL ENCOUNTER (OUTPATIENT)
Dept: CARDIOLOGY | Facility: HOSPITAL | Age: 65
End: 2020-04-01
Attending: FAMILY MEDICINE

## 2020-04-01 DIAGNOSIS — R53.83 FATIGUE, UNSPECIFIED TYPE: ICD-10-CM

## 2020-04-01 DIAGNOSIS — Z13.1 SCREENING FOR DIABETES MELLITUS: ICD-10-CM

## 2020-04-01 DIAGNOSIS — Z12.5 SCREENING FOR PROSTATE CANCER: ICD-10-CM

## 2020-04-01 DIAGNOSIS — R05.3 CHRONIC COUGH: ICD-10-CM

## 2020-04-01 DIAGNOSIS — R01.1 HEART MURMUR: ICD-10-CM

## 2020-04-01 DIAGNOSIS — R79.89 LOW TESTOSTERONE: ICD-10-CM

## 2020-04-01 DIAGNOSIS — Z13.220 LIPID SCREENING: ICD-10-CM

## 2020-04-01 LAB
ALBUMIN SERPL-MCNC: 4 G/DL (ref 3.4–5)
ALP SERPL-CCNC: 55 U/L (ref 40–150)
ALT SERPL W P-5'-P-CCNC: 48 U/L (ref 0–70)
ANION GAP SERPL CALCULATED.3IONS-SCNC: 6 MMOL/L (ref 3–14)
AST SERPL W P-5'-P-CCNC: 20 U/L (ref 0–45)
BASOPHILS # BLD AUTO: 0.1 10E9/L (ref 0–0.2)
BASOPHILS NFR BLD AUTO: 0.6 %
BILIRUB SERPL-MCNC: 0.7 MG/DL (ref 0.2–1.3)
BUN SERPL-MCNC: 17 MG/DL (ref 7–30)
CALCIUM SERPL-MCNC: 8.8 MG/DL (ref 8.5–10.1)
CHLORIDE SERPL-SCNC: 107 MMOL/L (ref 94–109)
CHOLEST SERPL-MCNC: 184 MG/DL
CO2 SERPL-SCNC: 27 MMOL/L (ref 20–32)
CREAT SERPL-MCNC: 1.02 MG/DL (ref 0.66–1.25)
DIFFERENTIAL METHOD BLD: NORMAL
EOSINOPHIL # BLD AUTO: 0.2 10E9/L (ref 0–0.7)
EOSINOPHIL NFR BLD AUTO: 1.7 %
ERYTHROCYTE [DISTWIDTH] IN BLOOD BY AUTOMATED COUNT: 13.4 % (ref 10–15)
GFR SERPL CREATININE-BSD FRML MDRD: 78 ML/MIN/{1.73_M2}
GLUCOSE SERPL-MCNC: 93 MG/DL (ref 70–99)
HCT VFR BLD AUTO: 42.9 % (ref 40–53)
HDLC SERPL-MCNC: 39 MG/DL
HGB BLD-MCNC: 14.6 G/DL (ref 13.3–17.7)
IMM GRANULOCYTES # BLD: 0 10E9/L (ref 0–0.4)
IMM GRANULOCYTES NFR BLD: 0.3 %
LDLC SERPL CALC-MCNC: 121 MG/DL
LYMPHOCYTES # BLD AUTO: 2.5 10E9/L (ref 0.8–5.3)
LYMPHOCYTES NFR BLD AUTO: 29.4 %
MCH RBC QN AUTO: 32.2 PG (ref 26.5–33)
MCHC RBC AUTO-ENTMCNC: 34 G/DL (ref 31.5–36.5)
MCV RBC AUTO: 95 FL (ref 78–100)
MONOCYTES # BLD AUTO: 0.7 10E9/L (ref 0–1.3)
MONOCYTES NFR BLD AUTO: 7.7 %
NEUTROPHILS # BLD AUTO: 5.2 10E9/L (ref 1.6–8.3)
NEUTROPHILS NFR BLD AUTO: 60.3 %
NONHDLC SERPL-MCNC: 145 MG/DL
NRBC # BLD AUTO: 0 10*3/UL
NRBC BLD AUTO-RTO: 0 /100
PLATELET # BLD AUTO: 217 10E9/L (ref 150–450)
POTASSIUM SERPL-SCNC: 4.5 MMOL/L (ref 3.4–5.3)
PROT SERPL-MCNC: 7.6 G/DL (ref 6.8–8.8)
PSA SERPL-ACNC: 2.11 UG/L (ref 0–4)
RBC # BLD AUTO: 4.53 10E12/L (ref 4.4–5.9)
SODIUM SERPL-SCNC: 140 MMOL/L (ref 133–144)
TRIGL SERPL-MCNC: 121 MG/DL
TSH SERPL DL<=0.005 MIU/L-ACNC: 2.91 MU/L (ref 0.4–4)
WBC # BLD AUTO: 8.6 10E9/L (ref 4–11)

## 2020-04-01 PROCEDURE — G0103 PSA SCREENING: HCPCS | Performed by: FAMILY MEDICINE

## 2020-04-01 PROCEDURE — 84270 ASSAY OF SEX HORMONE GLOBUL: CPT | Performed by: FAMILY MEDICINE

## 2020-04-01 PROCEDURE — 36415 COLL VENOUS BLD VENIPUNCTURE: CPT | Performed by: FAMILY MEDICINE

## 2020-04-01 PROCEDURE — 84403 ASSAY OF TOTAL TESTOSTERONE: CPT | Performed by: FAMILY MEDICINE

## 2020-04-01 PROCEDURE — 80061 LIPID PANEL: CPT | Performed by: FAMILY MEDICINE

## 2020-04-01 PROCEDURE — 93306 TTE W/DOPPLER COMPLETE: CPT | Mod: 26 | Performed by: INTERNAL MEDICINE

## 2020-04-01 PROCEDURE — 93306 TTE W/DOPPLER COMPLETE: CPT | Mod: TC

## 2020-04-01 PROCEDURE — 80050 GENERAL HEALTH PANEL: CPT | Performed by: FAMILY MEDICINE

## 2020-04-08 LAB
SHBG SERPL-SCNC: 43 NMOL/L (ref 11–80)
TESTOST FREE SERPL-MCNC: 7.28 NG/DL (ref 4.7–24.4)
TESTOST SERPL-MCNC: 421 NG/DL (ref 240–950)

## 2020-04-09 ENCOUNTER — TELEPHONE (OUTPATIENT)
Dept: FAMILY MEDICINE | Facility: OTHER | Age: 65
End: 2020-04-09

## 2020-04-09 NOTE — TELEPHONE ENCOUNTER
FYI Patient called because he was wondering what all the phone calls were that he had missed and it made him nervous.  He would like a physical, and does not want to start a lipid med at this time because he feels that his labs were not that bad.  Did discuss the reasoning with him such as age , smoker ect.  He still declined.

## 2020-11-16 ENCOUNTER — HEALTH MAINTENANCE LETTER (OUTPATIENT)
Age: 65
End: 2020-11-16

## 2021-09-18 ENCOUNTER — HEALTH MAINTENANCE LETTER (OUTPATIENT)
Age: 66
End: 2021-09-18

## 2022-01-08 ENCOUNTER — HEALTH MAINTENANCE LETTER (OUTPATIENT)
Age: 67
End: 2022-01-08

## 2022-06-01 ENCOUNTER — NURSE TRIAGE (OUTPATIENT)
Dept: FAMILY MEDICINE | Facility: OTHER | Age: 67
End: 2022-06-01

## 2022-06-01 ENCOUNTER — OFFICE VISIT (OUTPATIENT)
Dept: FAMILY MEDICINE | Facility: OTHER | Age: 67
End: 2022-06-01
Attending: NURSE PRACTITIONER

## 2022-06-01 VITALS
BODY MASS INDEX: 30.18 KG/M2 | RESPIRATION RATE: 20 BRPM | TEMPERATURE: 98 F | OXYGEN SATURATION: 95 % | SYSTOLIC BLOOD PRESSURE: 128 MMHG | DIASTOLIC BLOOD PRESSURE: 82 MMHG | HEART RATE: 82 BPM | WEIGHT: 198.5 LBS

## 2022-06-01 DIAGNOSIS — J01.00 SUBACUTE MAXILLARY SINUSITIS: ICD-10-CM

## 2022-06-01 DIAGNOSIS — Z20.822 SUSPECTED 2019 NOVEL CORONAVIRUS INFECTION: Primary | ICD-10-CM

## 2022-06-01 PROCEDURE — 87637 SARSCOV2&INF A&B&RSV AMP PRB: CPT | Performed by: NURSE PRACTITIONER

## 2022-06-01 PROCEDURE — 99213 OFFICE O/P EST LOW 20 MIN: CPT | Performed by: NURSE PRACTITIONER

## 2022-06-01 RX ORDER — AZITHROMYCIN 250 MG/1
TABLET, FILM COATED ORAL
Qty: 11 TABLET | Refills: 0 | Status: SHIPPED | OUTPATIENT
Start: 2022-06-01 | End: 2022-06-11

## 2022-06-01 ASSESSMENT — PAIN SCALES - GENERAL: PAINLEVEL: NO PAIN (0)

## 2022-06-01 NOTE — PATIENT INSTRUCTIONS
Assessment & Plan        Suspected 2019 novel coronavirus infection  - azithromycin (ZITHROMAX) 250 MG tablet; Take 2 tablets (500 mg) by mouth daily for 1 day, THEN 1 tablet (250 mg) daily for 9 days.    Subacute maxillary sinusitis  - Symptomatic; Yes; 5/20/2022 Influenza A/B & SARS-CoV2 (COVID-19) Virus PCR Multiplex; Future      Insure adequate fluid intake  Get plenty of rest  Monitor for temp at home, treat with OTC Tylenol or Ibuprofen per package instruction.  Humidity at home (add bacteriostatic solution to humidifier)  Please return in you do not improve  To UC or ER with persistent, worsening, or concerning symptoms      Luz Valdivia, JOHN  [facility] - [place] [facility]

## 2022-06-01 NOTE — TELEPHONE ENCOUNTER
Sinus congestion with pressure, sore throat and cough off and on.     covid test at [facility] on 5/31/22 with negative results.       Next 5 appointments (look out 90 days)    Jun 01, 2022  2:15 PM  (Arrive by 2:00 PM)  SHORT with Luz Valdivia CNP  [facility] ([facility] ) [address] [address]  [phone]            Reason for Disposition    [1] Sinus congestion (pressure, fullness) AND [2] present > 10 days    Additional Information    Negative: Severe difficulty breathing (e.g., struggling for each breath, speaks in single words)    Negative: Sounds like a life-threatening emergency to the triager    Negative: [1] Sinus infection AND [2] taking an antibiotic AND [3] symptoms continue    Negative: [1] Difficulty breathing AND [2] not from stuffy nose (e.g., not relieved by cleaning out the nose)    Negative: [1] SEVERE headache AND [2] fever    Negative: [1] Redness or swelling on the cheek, forehead or around the eye AND [2] fever    Negative: Fever > 104 F (40 C)    Negative: Patient sounds very sick or weak to the triager    Negative: [1] SEVERE pain AND [2] not improved 2 hours after pain medicine    Negative: [1] Redness or swelling on the cheek, forehead or around the eye AND [2] no fever    Negative: [1] Fever > 101 F (38.3 C) AND [2] age > 60    Negative: [1] Fever > 100.0 F (37.8 C) AND [2] bedridden (e.g., nursing home patient, CVA, chronic illness, recovering from surgery)    Negative: [1] Fever > 100.0 F (37.8 C) AND [2] diabetes mellitus or weak immune system (e.g., HIV positive, cancer chemo, splenectomy, organ transplant, chronic steroids)    Negative: Fever present > 3 days (72 hours)    Negative: [1] Fever returns after gone for over 24 hours AND [2] symptoms worse or not improved    Negative: [1] Sinus pain (not just congestion) AND [2] fever    Negative: Earache    Answer Assessment - Initial Assessment Questions  1.  "LOCATION: \"Where does it hurt?\"       Sinus     2. ONSET: \"When did the sinus pain start?\"  (e.g., hours, days)       X 1 1/2 weeks ago     3. SEVERITY: \"How bad is the pain?\"   (Scale 1-10; mild, moderate or severe)    - MILD (1-3): doesn't interfere with normal activities     - MODERATE (4-7): interferes with normal activities (e.g., work or school) or awakens from sleep    - SEVERE (8-10): excruciating pain and patient unable to do any normal activities         Moderate    4. RECURRENT SYMPTOM: \"Have you ever had sinus problems before?\" If so, ask: \"When was the last time?\" and \"What happened that time?\"       Yes, hx of allergies    5. NASAL CONGESTION: \"Is the nose blocked?\" If so, ask, \"Can you open it or must you breathe through the mouth?\"      Yes    6. NASAL DISCHARGE: \"Do you have discharge from your nose?\" If so ask, \"What color?\"      Yellowish in color    7. FEVER: \"Do you have a fever?\" If so, ask: \"What is it, how was it measured, and when did it start?\"       No     8. OTHER SYMPTOMS: \"Do you have any other symptoms?\" (e.g., sore throat, cough, earache, difficulty breathing)      Sore throat and cough intermittent    9. PREGNANCY: \"Is there any chance you are pregnant?\" \"When was your last menstrual period?\"      Na    Protocols used: SINUS PAIN OR CONGESTION-A-AH      "

## 2022-06-01 NOTE — PROGRESS NOTES
Assessment & Plan        Suspected 2019 novel coronavirus infection  - azithromycin (ZITHROMAX) 250 MG tablet; Take 2 tablets (500 mg) by mouth daily for 1 day, THEN 1 tablet (250 mg) daily for 9 days.    Subacute maxillary sinusitis  - Symptomatic; Yes; 5/20/2022 Influenza A/B & SARS-CoV2 (COVID-19) Virus PCR Multiplex; Future    Insure adequate fluid intake  Get plenty of rest  Monitor for temp at home, treat with OTC Tylenol or Ibuprofen per package instruction.  Humidity at home (add bacteriostatic solution to humidifier)  Please return in you do not improve  To UC or ER with persistent, worsening, or concerning symptoms      Luz Valdivia, JOHN  [facility] [place] JASON Herrera is a 66 year old who presents for the following health issues         Acute Illness  Acute illness concerns: cough  Onset/Duration: 05/20/2022  Symptoms:  Fever: no  Chills/Sweats: YES  Headache (location?): YES  Sinus Pressure: YES  Conjunctivitis:  no  Ear Pain: no  Rhinorrhea: YES  Congestion: YES  Sore Throat: YES  Cough: YES-productive of yellow sputum  Wheeze: no  Decreased Appetite: YES  Nausea: no  Vomiting: no  Diarrhea: no  Dysuria/Freq.: no  Dysuria or Hematuria: no  Fatigue/Achiness: YES  Sick/Strep Exposure: no  Therapies tried and outcome:  Tylenol sinus, nasal spray, dayquil and nyquil      COVID negative at [facility] yesterday      Patient Active Problem List   Diagnosis     Low testosterone     ACP (advance care planning)     Benign prostatic hyperplasia with urinary obstruction     Tobacco use disorder     Past Surgical History:   Procedure Laterality Date     BACK SURGERY      2014     HERNIA REPAIR      Over 30 years ago     PHACOEMULSIFICATION WITH STANDARD INTRAOCULAR LENS IMPLANT Right 10/23/2018    Procedure: PHACOEMULSIFICATION CATARACT EXTRACTION POSTERIOR CHAMBER LENS RIGHT;  Surgeon: Mac Varghese MD;  Location: [facility] OR       Social History     Tobacco Use     Smoking status: Current Every Day  Smoker     Packs/day: 1.00     Years: 5.00     Pack years: 5.00     Types: Cigarettes     Smokeless tobacco: Never Used     Tobacco comment: pt denied quit plan 9/28/18   Substance Use Topics     Alcohol use: Yes     Comment: daily use- beer      Family History   Problem Relation Age of Onset     Breast Cancer Mother      Other Cancer Sister              Current Outpatient Medications   Medication Sig Dispense Refill     fluticasone (FLONASE) 50 MCG/ACT nasal spray Spray 1 spray into both nostrils daily 16 g 3     sildenafil (VIAGRA) 100 MG tablet Take 1 tablet (100 mg) by mouth daily as needed 30 min to 4 hrs before sex. Do not use with nitroglycerin, terazosin or doxazosin. 12 tablet 11         Allergies   Allergen Reactions     Bee Venom Anaphylaxis         Recent Labs   Lab Test 04/01/20  0807 04/10/15  0000   * 118   HDL 39*  --    TRIG 121  --    ALT 48 37   CR 1.02  --    GFRESTIMATED 78  --    GFRESTBLACK >90  --    POTASSIUM 4.5  --    TSH 2.91  --         BP Readings from Last 3 Encounters:   06/01/22 128/82   03/11/20 126/74   10/23/18 121/80    Wt Readings from Last 3 Encounters:   06/01/22 90 kg (198 lb 8 oz)   03/11/20 90.7 kg (200 lb)   10/23/18 86.6 kg (191 lb)                  Review of Systems   Constitutional, HEENT, cardiovascular, pulmonary, gi and gu systems are negative, except as otherwise noted.          Objective    /82 (BP Location: Left arm, Patient Position: Sitting, Cuff Size: Adult Large)   Pulse 82   Temp 98  F (36.7  C) (Tympanic)   Resp 20   Wt 90 kg (198 lb 8 oz)   SpO2 95%   BMI 30.18 kg/m    Body mass index is 30.18 kg/m .       Physical Exam   GENERAL: healthy, alert and no distress  EYES: Eyes grossly normal to inspection, PERRL and conjunctivae and sclerae normal  HENT: sinuses: maxillary, frontal tenderness bilateral, yellow PND.  Nasal congestion  NECK: no adenopathy, no asymmetry, masses, or scars and thyroid normal to palpation  RESP:dry cough  CV:  regular rate and rhythm, normal S1 S2, no S3 or S4, no murmur, click or rub, no peripheral edema and peripheral pulses strong  SKIN: no suspicious lesions or rashes  PSYCH: mentation appears normal, affect normal/bright

## 2022-06-01 NOTE — NURSING NOTE
"Chief Complaint   Patient presents with     Cough     Nasal Congestion       Initial /82 (BP Location: Left arm, Patient Position: Sitting, Cuff Size: Adult Large)   Pulse 82   Temp 98  F (36.7  C) (Tympanic)   Resp 20   Wt 90 kg (198 lb 8 oz)   SpO2 95%   BMI 30.18 kg/m   Estimated body mass index is 30.18 kg/m  as calculated from the following:    Height as of 10/23/18: 1.727 m (5' 8\").    Weight as of this encounter: 90 kg (198 lb 8 oz).  Medication Reconciliation: complete  Donna Hyde LPN  "

## 2022-06-02 LAB
FLUAV RNA SPEC QL NAA+PROBE: NEGATIVE
FLUBV RNA RESP QL NAA+PROBE: NEGATIVE
RSV RNA SPEC NAA+PROBE: NEGATIVE
SARS-COV-2 RNA RESP QL NAA+PROBE: NEGATIVE

## 2022-11-20 ENCOUNTER — HEALTH MAINTENANCE LETTER (OUTPATIENT)
Age: 67
End: 2022-11-20

## 2023-04-15 ENCOUNTER — HEALTH MAINTENANCE LETTER (OUTPATIENT)
Age: 68
End: 2023-04-15

## 2024-05-03 ENCOUNTER — TELEPHONE (OUTPATIENT)
Dept: FAMILY MEDICINE | Facility: OTHER | Age: 69
End: 2024-05-03

## 2024-06-16 ENCOUNTER — HEALTH MAINTENANCE LETTER (OUTPATIENT)
Age: 69
End: 2024-06-16

## (undated) DEVICE — GLV-7.0 PROTEXIS PI CLASSIC LF/PF

## (undated) DEVICE — LABEL-STERILE PREPRINTED FOR OR

## (undated) DEVICE — HANDPIECE-CAPSULEGUARD I/A STELLARIS

## (undated) DEVICE — BIN-CATARACT BIN

## (undated) DEVICE — LENS DELIVERY SYSTEM-SOFPORT LI61AO (EZ-28)

## (undated) DEVICE — BIN-LENS IMPLANT CART

## (undated) DEVICE — INSTRUMENT WIPE-VISIWIPE

## (undated) DEVICE — GLV-7.5 PROTEXIS PI CLASSIC LF/PF

## (undated) DEVICE — CANNULA-NUCLEUS HYDRODISSECTOR

## (undated) DEVICE — PACK-EYE-CUSTOM

## (undated) DEVICE — BETADINE 5% STERILE OPHTHALMIC SOLUTION 1 OZ.

## (undated) DEVICE — SENSOR-OXISENSOR II ADULT

## (undated) DEVICE — KNIFE-KERATOME SLIT 2.8MM

## (undated) DEVICE — CYSTOTOME-IRRIGATING  25G

## (undated) DEVICE — PACK-PHACO STELLARIS

## (undated) DEVICE — BIN-TECNIS DCB00 LENSES

## (undated) DEVICE — KNIFE-MICRO UNITOME 5.0MM